# Patient Record
Sex: MALE | Race: BLACK OR AFRICAN AMERICAN
[De-identification: names, ages, dates, MRNs, and addresses within clinical notes are randomized per-mention and may not be internally consistent; named-entity substitution may affect disease eponyms.]

---

## 2018-08-21 ENCOUNTER — HOSPITAL ENCOUNTER (OUTPATIENT)
Dept: HOSPITAL 97 - ER | Age: 58
Setting detail: OBSERVATION
LOS: 1 days | Discharge: HOME | End: 2018-08-22
Attending: FAMILY MEDICINE | Admitting: INTERNAL MEDICINE
Payer: SELF-PAY

## 2018-08-21 VITALS — BODY MASS INDEX: 36.9 KG/M2

## 2018-08-21 DIAGNOSIS — E78.5: ICD-10-CM

## 2018-08-21 DIAGNOSIS — Z91.14: ICD-10-CM

## 2018-08-21 DIAGNOSIS — K76.0: ICD-10-CM

## 2018-08-21 DIAGNOSIS — F17.210: ICD-10-CM

## 2018-08-21 DIAGNOSIS — E66.01: ICD-10-CM

## 2018-08-21 DIAGNOSIS — G47.33: ICD-10-CM

## 2018-08-21 DIAGNOSIS — I48.3: Primary | ICD-10-CM

## 2018-08-21 DIAGNOSIS — I10: ICD-10-CM

## 2018-08-21 DIAGNOSIS — I07.1: ICD-10-CM

## 2018-08-21 DIAGNOSIS — R74.8: ICD-10-CM

## 2018-08-21 DIAGNOSIS — I48.0: ICD-10-CM

## 2018-08-21 LAB
HCT VFR BLD CALC: 48.9 % (ref 39.6–49)
INR BLD: 0.97
LYMPHOCYTES # SPEC AUTO: 2.5 K/UL (ref 0.7–4.9)
MCH RBC QN AUTO: 31.6 PG (ref 27–35)
MCV RBC: 92.8 FL (ref 80–100)
PMV BLD: 9.2 FL (ref 7.6–11.3)
RBC # BLD: 5.27 M/UL (ref 4.33–5.43)

## 2018-08-21 PROCEDURE — 85025 COMPLETE CBC W/AUTO DIFF WBC: CPT

## 2018-08-21 PROCEDURE — 93306 TTE W/DOPPLER COMPLETE: CPT

## 2018-08-21 PROCEDURE — 82550 ASSAY OF CK (CPK): CPT

## 2018-08-21 PROCEDURE — 36415 COLL VENOUS BLD VENIPUNCTURE: CPT

## 2018-08-21 PROCEDURE — 80048 BASIC METABOLIC PNL TOTAL CA: CPT

## 2018-08-21 PROCEDURE — 85730 THROMBOPLASTIN TIME PARTIAL: CPT

## 2018-08-21 PROCEDURE — 82553 CREATINE MB FRACTION: CPT

## 2018-08-21 PROCEDURE — 96361 HYDRATE IV INFUSION ADD-ON: CPT

## 2018-08-21 PROCEDURE — 84439 ASSAY OF FREE THYROXINE: CPT

## 2018-08-21 PROCEDURE — 96374 THER/PROPH/DIAG INJ IV PUSH: CPT

## 2018-08-21 PROCEDURE — 83880 ASSAY OF NATRIURETIC PEPTIDE: CPT

## 2018-08-21 PROCEDURE — 84443 ASSAY THYROID STIM HORMONE: CPT

## 2018-08-21 PROCEDURE — 94760 N-INVAS EAR/PLS OXIMETRY 1: CPT

## 2018-08-21 PROCEDURE — 80076 HEPATIC FUNCTION PANEL: CPT

## 2018-08-21 PROCEDURE — 83735 ASSAY OF MAGNESIUM: CPT

## 2018-08-21 PROCEDURE — 87086 URINE CULTURE/COLONY COUNT: CPT

## 2018-08-21 PROCEDURE — 96375 TX/PRO/DX INJ NEW DRUG ADDON: CPT

## 2018-08-21 PROCEDURE — 96372 THER/PROPH/DIAG INJ SC/IM: CPT

## 2018-08-21 PROCEDURE — 84484 ASSAY OF TROPONIN QUANT: CPT

## 2018-08-21 PROCEDURE — 93005 ELECTROCARDIOGRAM TRACING: CPT

## 2018-08-21 PROCEDURE — 87088 URINE BACTERIA CULTURE: CPT

## 2018-08-21 PROCEDURE — 83036 HEMOGLOBIN GLYCOSYLATED A1C: CPT

## 2018-08-21 PROCEDURE — 99285 EMERGENCY DEPT VISIT HI MDM: CPT

## 2018-08-21 PROCEDURE — 81001 URINALYSIS AUTO W/SCOPE: CPT

## 2018-08-21 PROCEDURE — 85610 PROTHROMBIN TIME: CPT

## 2018-08-21 PROCEDURE — 71045 X-RAY EXAM CHEST 1 VIEW: CPT

## 2018-08-22 VITALS — DIASTOLIC BLOOD PRESSURE: 83 MMHG | TEMPERATURE: 97.6 F | SYSTOLIC BLOOD PRESSURE: 142 MMHG

## 2018-08-22 VITALS — OXYGEN SATURATION: 94 %

## 2018-08-22 LAB
ALBUMIN SERPL BCP-MCNC: 3.6 G/DL (ref 3.4–5)
ALP SERPL-CCNC: 184 U/L (ref 45–117)
ALT SERPL W P-5'-P-CCNC: 24 U/L (ref 12–78)
AST SERPL W P-5'-P-CCNC: 15 U/L (ref 15–37)
BUN BLD-MCNC: 10 MG/DL (ref 7–18)
CKMB CREATINE KINASE MB: 1.2 NG/ML (ref 0.3–3.6)
GLUCOSE SERPLBLD-MCNC: 145 MG/DL (ref 74–106)
MAGNESIUM SERPL-MCNC: 2.1 MG/DL (ref 1.8–2.4)
NT-PROBNP SERPL-MCNC: 41 PG/ML (ref ?–125)
POTASSIUM SERPL-SCNC: 3.3 MMOL/L (ref 3.5–5.1)
TSH SERPL DL<=0.05 MIU/L-ACNC: 2.1 UIU/ML (ref 0.36–3.74)
UA COMPLETE W REFLEX CULTURE PNL UR: (no result)

## 2018-08-22 RX ADMIN — METOPROLOL TARTRATE SCH MG: 25 TABLET ORAL at 05:08

## 2018-08-22 RX ADMIN — METOPROLOL TARTRATE SCH MG: 25 TABLET ORAL at 17:02

## 2018-08-22 NOTE — EKG
Test Date:    2018-08-21               Test Time:    23:56:08

Technician:   MT                                     

                                                     

MEASUREMENT RESULTS:                                       

Intervals:                                           

Rate:         118                                    

CO:                                                  

QRSD:         126                                    

QT:           396                                    

QTc:          555                                    

Axis:                                                

P:            98                                     

CO:                                                  

QRS:          30                                     

T:            60                                     

                                                     

INTERPRETIVE STATEMENTS:                                       

                                                     

Atrial flutter with variable AV block

T wave abnormality, non specific

Abnormal ECG

Compared to ECG 08/21/2018 23:29:05

no significant change from previous ECG

Electronically Signed On 08-22-18 07:19:01 CDT by Ebenezer Hsu

## 2018-08-22 NOTE — P.DS
Admission Date: 08/22/18


Discharge Date: 08/22/18


Primary Care Provider: none


Disposition: ROUTINE DISCHARGE


Discharge Condition: GOOD


Reason for Admission: Atrial flutter


Consultations: 





Cardiology-Dr. Neumann


Procedures: 





Echocardiogram:





- Problems


(1) Atrial flutter


Current Visit: Yes   Status: Acute   


Qualifiers: 


   Atrial flutter type: typical   Qualified Code(s): I48.3 - Typical atrial 

flutter   





(2) Tobacco abuse


Current Visit: Yes   Status: Chronic   





(3) Fatty liver


Onset Date: 10/17/17   Current Visit: No   Status: Chronic   





(4) HTN (hypertension)


Onset Date: 04/27/16   Current Visit: No   Status: Chronic   


Qualifiers: 


   Hypertension type: essential hypertension   Qualified Code(s): I10 - 

Essential (primary) hypertension   





(5) Obesity


Current Visit: No   Status: Chronic   


Qualifiers: 


   Obesity type: unspecified obesity type   Obesity classification: adult class 

2 (BMI 35 - 39.9)   Serious obesity comorbidity presence: with serious 

comorbidity   Body mass index: BMI 38.0-38.9   Qualified Code(s): E66.01 - 

Morbid (severe) obesity due to excess calories; Z68.38 - Body mass index (BMI) 

38.0-38.9, adult   





(6) Obstructive sleep apnea


Current Visit: No   Status: Suspected   


Brief History of Present Illness: 





57-year-old  male presented emergency room with chest pain, 

dizziness and palpitations.  Patient has a history of atrial fibrillation and 

hypertension.  Patient has been non compliant with his medication.  He decided 

to stop this medication about 5 months ago.  Patient presented to emergency 

room found to be in atrial flutter.  Patient given medication and admitted for 

treatment.


Hospital Course: 





Patient admitted for chest pain, palpitations and dizziness.  Patient found to 

be in atrial flutter.  Patient with history of atrial fibrillation in the past 

but non compliant.  Patient stopped his medications about 5 months ago.  

Patient was given medication.  Rate better controlled.  Patient also started on 

anti coagulation therapy.  Patient evaluated by Cardiology.  No further 

intervention is required at this time.  Cardiology recommends to continue with 

metoprolol 25 mg 1 pill twice daily.  Patient will also be started on anti 

coagulation therapy-Eliquis 5 mg 1 pill twice daily.  Compliance was addressed 

in detail with the patient.  Patient understands this.  Recommendation is for 

the patient to follow up with cardiology in 1 week to follow up this 

hospitalization and continue his care.





Patient has hypertension.  Medications have been adjusted.  Patient will 

continue with metoprolol 25 mg 1 pill twice daily.  Recommendation is to 

maintain blood pressures less 150/80.  Further adjustment can be done by his 

PCP.





Patient has hyperlipidemia.  Patient will continue with medication-Lipitor 10 

mg daily.





Patient likely has underlying obstructive sleep apnea.  Recommendation is for 

the patient to have a sleep study done as an outpatient to further evaluate.


Vital Signs/Physical Exam: 














Temp Pulse Resp BP Pulse Ox


 


 98.4 F   119 H  20   119/79   96 


 


 08/22/18 08:00  08/22/18 08:00  08/22/18 08:00  08/22/18 08:00  08/22/18 08:00








General: Alert, In no apparent distress, Oriented x3, Cooperative


HEENT: Atraumatic


Neck: Supple


Respiratory: Clear to auscultation bilaterally, Normal air movement


Cardiovascular: Irregular heart rate/rhythm (Atrial flutter rate better 

controlled)


Gastrointestinal: Normal bowel sounds, Soft and benign, Non-distended, No 

tenderness, No masses, No rebound, No guarding


Musculoskeletal: No erythema, No tenderness, No warmth


Integumentary: No tenderness/swelling, No erythema, No warmth, No cyanosis


Neurological: Normal speech, Normal strength at 5/5 x4 extr, Normal tone, 

Normal affect


Laboratory Data at Discharge: 














WBC  9.4 K/uL (4.3-10.9)   08/21/18  23:30    


 


Hgb  16.6 g/dL (13.6-17.9)   08/21/18  23:30    


 


Hct  48.9 % (39.6-49.0)   08/21/18  23:30    


 


Plt Count  268 K/uL (152-406)   08/21/18  23:30    


 


PT  11.5 SECONDS (9.5-12.5)   08/21/18  23:30    


 


INR  0.97   08/21/18  23:30    


 


APTT  36.6 SECONDS (24.3-36.9)   08/21/18  23:30    


 


Sodium  143 mmol/L (136-145)   08/21/18  23:30    


 


Potassium  3.3 mmol/L (3.5-5.1)  L  08/21/18  23:30    


 


BUN  10 mg/dL (7-18)   08/21/18  23:30    


 


Creatinine  1.10 mg/dL (0.55-1.3)   08/21/18  23:30    


 


Glucose  145 mg/dL ()  H  08/21/18  23:30    


 


Magnesium  2.1 mg/dL (1.8-2.4)   08/21/18  23:30    


 


Total Bilirubin  0.2 mg/dL (0.2-1.0)   08/21/18  23:30    


 


AST  15 U/L (15-37)   08/21/18  23:30    


 


ALT  24 U/L (12-78)   08/21/18  23:30    


 


Alkaline Phosphatase  184 U/L ()  H  08/21/18  23:30    


 


Troponin I  0.14 ng/mL (0.0-0.045)  H  08/22/18  10:10    








Home Medications: 








Apixaban [Eliquis] 5 mg PO BID #60 tablet 08/22/18 


Atorvastatin Calcium [Lipitor] 10 mg PO BEDTIME #30 tab 08/22/18 


Metoprolol Tartrate [Lopressor*] 25 mg PO BID 6AM 6PM #60 tab 08/22/18 





New Medications: 


Apixaban [Eliquis] 5 mg PO BID #60 tablet


Atorvastatin Calcium [Lipitor] 10 mg PO BEDTIME #30 tab


Metoprolol Tartrate [Lopressor*] 25 mg PO BID 6AM 6PM #60 tab


Patient Discharge Instructions: 1.  Patient will need a follow up with his PCP 

in 1 week to follow up this hospitalization.  Patient will be provided 

information on PCPs in the area.  2.  Patient admitted for chest pain, 

palpitations and dizziness.  Patient found to be in atrial flutter.  Patient 

with history of atrial fibrillation in the past but non compliant.  Patient 

stopped his medications about 5 months ago.  Patient was given medication.  

Rate better controlled.  Patient also started on anti coagulation therapy.  

Patient evaluated by Cardiology.  No further intervention is required at this 

time.  Cardiology recommends to continue with metoprolol 25 mg 1 pill twice 

daily.  Patient will also be started on anti coagulation therapy-Eliquis 5 mg 1 

pill twice daily.  Compliance was addressed in detail with the patient.  

Patient understands this.  Recommendation is for the patient to follow up with 

cardiology in 1 week to follow up this hospitalization and continue his care.  

3.  Patient has hypertension.  Medications have been adjusted.  Patient will 

continue with metoprolol 25 mg 1 pill twice daily.  Recommendation is to 

maintain blood pressures less 150/80.  Further adjustment can be done by his 

PCP.  4. Patient has hyperlipidemia.  Patient will continue with medication-

Lipitor 10 mg daily.  5.  Patient likely has underlying obstructive sleep 

apnea.  Recommendation is for the patient to have a sleep study done as an 

outpatient to further evaluate.


Diet: AHA


Activity: Ad angélica


Time spent managing pt's care (in minutes): 55

## 2018-08-22 NOTE — ER
Nurse's Notes                                                                                     

 [facility]                                                                

Name: Damian Briggs                                                                                 

Age: 57 yrs                                                                                       

Sex: Male                                                                                         

: 1960                                                                                   

MRN: [id number]                                                                                   

Arrival Date: 2018                                                                          

Time: 23:22                                                                                       

Account#: [id number]                                                                            

Bed 2                                                                                             

Private MD:                                                                                       

Diagnosis: Atrial fibrillation and flutter;Chest pain, unspecified;Shortness of breath            

                                                                                                  

Presentation:                                                                                     

                                                                                             

23:36 Presenting complaint: Patient states: "I felt my heart racing"; States feeling          lp1 

      palpitations that began at 2200, complaint of feeling dizzy upon standing, short of         

      breath, chest pain. Transition of care: patient was not received from another setting       

      of care. Onset of symptoms was 2018 at 22:00. Risk Assessment: Do you want       

      to hurt yourself or someone else? Patient reports no desire to harm self or others.         

      Initial Sepsis Screen: Does the patient meet any 2 criteria? No. Patient's initial          

      sepsis screen is negative. Does the patient have a suspected source of infection? No.       

      Patient's initial sepsis screen is negative. Care prior to arrival: None.                   

23:36 Method Of Arrival: Wheelchair                                                           lp1 

23:36 Acuity: LIYA 2                                                                           lp1 

                                                                                                  

Triage Assessment:                                                                                

23:55 General: Appears in no apparent distress. Behavior is calm, cooperative.                ak1 

                                                                                             

00:03 Pain: Complains of pain in chest. EENT: No signs and/or symptoms were reported          ak1 

      regarding the EENT system. Neuro: No deficits noted. Cardiovascular: Reports chest          

      pain, diaphoresis, lightheadedness, palpitations, shortness of breath, Rhythm is atrial     

      flutter. Respiratory: Reports shortness of breath. GI: No signs and/or symptoms were        

      reported involving the gastrointestinal system. : No signs and/or symptoms were           

      reported regarding the genitourinary system. Derm: No signs and/or symptoms reported        

      regarding the dermatologic system. Musculoskeletal: No signs and/or symptoms reported       

      regarding the musculoskeletal system.                                                       

                                                                                                  

Historical:                                                                                       

- Allergies:                                                                                      

                                                                                             

23:38 No Known Allergies;                                                                     lp1 

- Home Meds:                                                                                      

23:38 None [Active];                                                                          lp1 

- PMHx:                                                                                           

23:38 Hypertension;                                                                           lp1 

- PSHx:                                                                                           

23:38 None;                                                                                   lp1 

                                                                                                  

- Immunization history:: Adult Immunizations up to date.                                          

- Social history:: Smoking status: Patient uses tobacco products, smokes one pack                 

  cigarettes per day.                                                                             

- Ebola Screening: : No symptoms or risks identified at this time.                                

                                                                                                  

                                                                                                  

Screenin:38 Abuse screen: Denies threats or abuse. Denies injuries from another. Nutritional        lp1 

      screening: No deficits noted. Tuberculosis screening: No symptoms or risk factors           

      identified. Fall Risk None identified.                                                      

                                                                                                  

Assessment:                                                                                       

                                                                                             

00:29 Reassessment: Patient appears in no apparent distress at this time. No changes from     ak1 

      previously documented assessment. see triage assessment.                                    

02:00 Reassessment: Patient appears in no apparent distress at this time. Patient and/or      lp1 

      family updated on plan of care and expected duration. Pain level reassessed. Patient is     

      alert, oriented x 3, equal unlabored respirations, skin warm/dry/pink. Patient aware of     

      admission.                                                                                  

                                                                                                  

Vital Signs:                                                                                      

                                                                                             

23:37  / 100; Pulse 140; Resp 22; Temp 98.4(O); Pulse Ox 100% on R/A; Weight 99.79 kg;  lp1 

      Height 5 ft. 7 in. (170.18 cm); Pain 6/10;                                                  

23:45  / 110; Pulse 139; Resp 18;                                                       ak1 

23:50  / 113; Pulse 118; Resp 16; Pulse Ox 100% ;                                       ak1 

                                                                                             

00:13  / 108; Pulse 120; Resp 18; Pulse Ox 100% on R/A;                                 ak1 

00:28  / 108; Pulse 120; Resp 20; Pulse Ox 100% on R/A;                                 ak1 

00:57  / 106; Pulse 120; Resp 20; Pulse Ox 97% on R/A;                                  ak1 

01:13  / 97; Pulse 121; Resp 20; Pulse Ox 98% on R/A;                                   mt  

01:32  / 99; Pulse 132; Resp 20; Pulse Ox 98% on R/A;                                   mt  

02:38  / 99; Pulse 123; Resp 20; Pulse Ox 98% on R/A; Pain 0/10;                        lp1 

03:30  / 93; Pulse 121; Resp 20; Pulse Ox 99% on R/A;                                   lp1 

                                                                                             

23:37 Body Mass Index 34.46 (99.79 kg, 170.18 cm)                                             lp1 

                                                                                                  

ED Course:                                                                                        

                                                                                             

23:22 Patient arrived in ED.                                                                  es  

23:28 Flakito Muñoz NP is PHCP.                                                           pm1 

23:28 Jason Strong MD is Attending Physician.                                            pm1 

23:37 Triage completed.                                                                       lp1 

23:38 Kalani Guerra RN is Primary Nurse.                                                     ak1 

23:38 Arm band placed on left wrist.                                                          lp1 

23:39 Patient has correct armband on for positive identification. Placed in gown. Bed in low  lp1 

      position. Call light in reach. Cardiac monitor on. Pulse ox on. NIBP on.                    

23:39 Inserted saline lock: 20 gauge in left antecubital area, using aseptic technique. Blood lp1 

      collected. By Magnolia Schultz RN.                                                           

23:39 EKG done, by ED staff, reviewed by Flakito Muñoz NP.                                  lp1 

                                                                                             

00:02 X-ray completed. Portable x-ray completed in exam room. Patient tolerated procedure     kw  

      well.                                                                                       

00:22 XRAY Chest (1 view) In Process Unspecified.                                             EDMS

01:29 Adeel Javed MD is Hospitalizing Provider.                                          pm1 

01:48 No provider procedures requiring assistance completed. Patient admitted, IV remains in  lp1 

      place.                                                                                      

01:49 Report received from Kalani Guerra RN.                                                  lp1 

                                                                                                  

Administered Medications:                                                                         

                                                                                             

23:40 Drug: Aspirin 325 mg Route: PO;                                                         ak1 

23:45 Follow up:  / 110; Pulse 139 bpm; Resp 18 bpm                                     ak1 

23:45 Drug: Lopressor 5 mg Route: IVP; Site: left antecubital;                                ak1 

23:50 Drug: Lopressor 5 mg Route: IVP; Site: left antecubital;                                ak1 

                                                                                             

00:12 Drug: Lopressor 5 mg Route: IVP; Site: left antecubital;                                ak1 

00:12 Follow up: Response: No adverse reaction                                                ak1 

00:28 Drug: Lovenox 1 mg/kg Route: Sub-Q; Site: right lower abdomen;                          ak1 

00:29 Follow up: Response: No adverse reaction                                                ak1 

00:28 Drug: Lopressor (metoprolol TARTRATE) 50 mg Route: PO;                                  ak1 

00:30 Follow up: Response: No adverse reaction                                                ak1 

00:40 Drug: Cardizem 10 mg Route: IVP; Site: left antecubital;                                ak1 

01:48 Follow up: Response: No adverse reaction                                                lp1 

01:47 Drug: NS 0.9% 1000 ml Route: IV; Rate: 125 ml/hr; Site: left antecubital;               lp1 

02:36 Follow up: IV Status: IV converted to saline lock                                       lp1 

02:32 Drug: Potassium Effervescent Tablet 50 mEq Route: PO;                                   lp1 

03:58 Follow up: Response: No adverse reaction                                                lp1 

                                                                                                  

                                                                                                  

Outcome:                                                                                          

01:30 Decision to Hospitalize by Provider.                                                    pm1 

01:52 Condition: stable                                                                       lp1 

01:52 Instructed on the need for admit.                                                           

03:00 Admitted to Med/surg accompanied by nurse, via wheelchair, room 207, with chart, Report lp1 

      called to  Kinza Karimi RN                                                                   

03:58 Patient left the ED.                                                                    lp1 

                                                                                                  

Signatures:                                                                                       

Dispatcher [employer]                           EDMS                                                 

Izzy Hannon Kimberlee kw Pena, Laura, RN                         RN   lp1                                                  

Kalani Guerra RN                       RN   ak1                                                  

Flakito Muñoz, NP                    NP   pm1                                                  

Janine Eason mt                                                   

                                                                                                  

Corrections: (The following items were deleted from the chart)                                    

                                                                                             

23:41 23:37  / 100; Pulse 140bpm; Resp 22bpm; Pulse Ox 100% RA; Pain 6/10; lp1          lp1 

                                                                                                  

**************************************************************************************************

## 2018-08-22 NOTE — P.HP
Certification for Inpatient


Patient admitted to: Observation


With expected LOS: <2 Midnights


Practitioner: I am a practitioner with admitting privileges, knowledge of 

patient current condition, hospital course, and medical plan of care.


Services: Services provided to patient in accordance with Admission 

requirements found in Title 42 Section 412.3 of the Code of Federal Regulations





Patient History


Date of Service: 08/22/18


Reason for admission: Atrial flutter


History of Present Illness: 





Mr. Briggs is a 57-year-old male history of hypertension, tobacco abuse, previous 

episode of atrial flutter.  According to him, he is to take about 10 pills 

because this problem, prescribed by his cardiologist, however, since 5 months 

ago he stopped his medication since his was feeling better.  About 2200 last 

night, the patient started feeling palpitation, associated with dizziness.  He 

also had chest pain, located in his left parasternal area, not radiated, about 5

/10 of intensity.  At arrival to ED, his HR was of 150s.  He also was 

hypertensive, BP was 190/100.  No history of fever.  The patient was treated 

with IV Cardizem and Lopressor, his HR final decreased to 110-120.  Chest pain 

resolved when the HR was better controlled.  Laboratory work remarkable for 

hypokalemia, 3.3, troponin I is negative so far.


Allergies





No Known Allergies Allergy (Verified 12/12/17 10:45)


 





Home medications list reviewed: Yes


Home Medications: 








Lisinopril [Prinivil*] 20 mg PO BID #60 tab 10/20/17 








- Past Medical/Surgical History


Diabetic: No


-: HTN


-: Tobacco abuse


-: Atrial flutter


Past Surgical History: Reviewed- Non-Contributory


Psychosocial/ Personal History: The patient is  2 years.  He has no 

children.  He works as a 





- Family History


  ** Mother


-: Heart disease





- Social History


Smoking Status: Current every day smoker


Counseled patient to stop smoking for: less than 10 minutes


Smoking therapy provided: Yes


Patient receptive to therapy: No


Alcohol use: Yes


CD- Drugs: No


Caffeine use: Yes


Place of Residence: Home





Review of Systems


10-point ROS is otherwise unremarkable





Physical Examination





- Physical Exam


General: Alert, In no apparent distress


HEENT: Atraumatic, PERRLA, Mucous membr. moist/pink, EOMI, Sclerae nonicteric


Neck: Supple, 2+ carotid pulse no bruit, No LAD, Without JVD or thyroid 

abnormality


Respiratory: Clear to auscultation bilaterally, Normal air movement


Cardiovascular: Normal S1 S2, Irregular heart rate/rhythm


Gastrointestinal: Normal bowel sounds, No tenderness


Musculoskeletal: No tenderness


Integumentary: No rashes


Neurological: Normal speech, Normal strength at 5/5 x4 extr, Normal tone, 

Normal affect


Lymphatics: No axilla or inguinal lymphadenopathy





- Studies


Laboratory Data (last 24 hrs)





08/21/18 23:30: PT 11.5, INR 0.97, APTT 36.6


08/21/18 23:30: WBC 9.4, Hgb 16.6, Hct 48.9, Plt Count 268


08/21/18 23:30: Sodium 143, Potassium 3.3 L, BUN 10, Creatinine 1.10, Glucose 

145 H, Magnesium 2.1, Total Bilirubin 0.2, AST 15, ALT 24, Alkaline Phosphatase 

184 H








Assessment and Plan





- Problems (Diagnosis)


(1) Atrial flutter


Current Visit: Yes   Status: Acute   


Qualifiers: 


   Atrial flutter type: typical   Qualified Code(s): I48.3 - Typical atrial 

flutter   





(2) Tobacco abuse


Current Visit: Yes   Status: Acute   





(3) HTN (hypertension)


Onset Date: 04/27/16   Current Visit: No   Status: Chronic   


Qualifiers: 


   Hypertension type: essential hypertension   Qualified Code(s): I10 - 

Essential (primary) hypertension   





- Plan





Mr. Briggs will be admitted to the hospital due to atrial flutter with 2:1 block.

  Rate has been better controlled with IV Cardizem and Lopressor.  Will start 

full anticoagulation and keep him NPO in case the patient require electric 

cardioversion.  Will continue on a beta-blocker, consult cardiologist.  Will 

order echo in the a.m.





- Advance Directives


Does patient have a Living Will: No


Does patient have a Durable POA for Healthcare: No





- Code Status/Comfort Care


Code Status Assessed: Yes


Code Status: Full Code

## 2018-08-22 NOTE — RAD REPORT
EXAM DESCRIPTION:  RAD - Chest Single View - 8/22/2018 12:21 am

 

CLINICAL HISTORY:  Palpitations, chest pain, shortness of breath

 

COMPARISON:  None.

 

TECHNIQUE:  AP portable chest image was obtained 2352 hours .

 

FINDINGS:  Lungs are clear. Heart and vasculature are normal. No measurable pleural effusion and no p
neumothorax. No gross bony abnormality seen. No acute aortic findings suspected.

 

IMPRESSION:  No acute cardiopulmonary process.

## 2018-08-22 NOTE — CON
Date of Consultation:  08/22/2018



Additional Admitting Physician:  Ruben Matt DO.



Reason For Consultation:  Atrial fibrillation and flutter.



History Of Present Illness:  Mr. Briggs is a 57-year-old black male with history of hypertension, has h
ad a history of atrial fibrillation before, in 2016 had a negative echocardiogram and negative Lexisc
an, was placed on some medication at that point, but he basically quit taking his medication, came in
 with palpitation, was found to have atrial flutter to 2-1 ratio.  This has already resolved on metop
rolol 25 mg b.i.d.  He is on Lovenox right now.  He is asymptomatic.  Denied any chest pain, nausea, 
vomiting, diaphoresis, PND, orthopnea, pedal edema, or syncope.  His troponin was 0.19.  Glucose was 
145.  His potassium was 3.3.



Past Medical History:  Hypertension and paroxysmal atrial fibrillation.



Allergies:  NONE.



Review of Systems:

Negative.



Social History:  Negative.



Medications At Home:  Supposed to be Prinivil, but I am not so sure he is taking it.



Family History:  Noncontributory.



Physical Examination:

Vital Signs:  Stable.  He is in a sinus rhythm now.  Afebrile. 

HEENT:  Negative. 

Neck:  Supple with no bruit. 

Chest:  Clear. 

Cardiac:  Revealed a regular rhythm and rate without any murmurs, gallops, or rubs. 

Abdomen:  Benign. 

Extremities:  Revealed no clubbing, cyanosis, or edema.



Diagnostic Data:  As stated earlier.



Impression And Plan:  

1.Paroxysmal atrial fibrillation, resolved on beta-blocker.

2.Hypertension.

3.Noncompliance.  

I think Mr. Briggs needs to be on a beta-blocker and he should be on anticoagulant.  He has a high YUE
S score and echocardiogram is pending.  Compliance with his medication needs to be re-addressed.  He 
can go home as far as I am concerned on a beta-blocker and either Xarelto or Eliquis.  I will leave t
hat up to Dr. Matt.  I would like to see him in the office in the next 2 weeks.  He has had a negat
derrick stress test in 2016 and I see no need to repeat that.  His potassium needs to be corrected.  Elev
ation in his troponin is secondary to his arrhythmia.





NB/MODL

DD:  08/22/2018 18:37:10Voice ID:  [id number]

DT:  08/22/2018 23:11:53Report ID:  [id number]

## 2018-08-22 NOTE — EKG
Test Date:    2018-08-21               Test Time:    23:29:05

Technician:   MT                                     

                                                     

MEASUREMENT RESULTS:                                       

Intervals:                                           

Rate:         148                                    

WI:                                                  

QRSD:         86                                     

QT:           346                                    

QTc:          543                                    

Axis:                                                

P:            269                                    

WI:                                                  

QRS:          28                                     

T:            264                                    

                                                     

INTERPRETIVE STATEMENTS:                                       

                                                     

Atrial flutter with variable AV block

Abnormal ECG

Compared to ECG 04/26/2016 13:05:23

Sinus bradycardia no longer present



Electronically Signed On 08-22-18 07:19:23 CDT by Ebenezer Hsu

## 2018-08-22 NOTE — EKG
Test Date:    2018-08-22               Test Time:    00:10:18

Technician:   MT                                     

                                                     

MEASUREMENT RESULTS:                                       

Intervals:                                           

Rate:         119                                    

TX:                                                  

QRSD:         80                                     

QT:           342                                    

QTc:          481                                    

Axis:                                                

P:            248                                    

TX:                                                  

QRS:          18                                     

T:            -72                                    

                                                     

INTERPRETIVE STATEMENTS:                                       

                                                     

Atrial flutter with 2:1 AV conduction

Left ventricular hypertrophy with repolarization abnormality

Anterior infarct, age undetermined

Abnormal ECG

Compared to ECG 08/21/2018 23:56:08

Myocardial infarct finding now present

ST (T wave) deviation no longer present

T-wave abnormality no longer present

Possible ischemia no longer present



Electronically Signed On 08-22-18 07:17:58 CDT by Ebenezer Hsu

## 2018-08-22 NOTE — EDPHYS
Physician Documentation                                                                           

 [facility]                                                                

Name: Damian Briggs                                                                                 

Age: 57 yrs                                                                                       

Sex: Male                                                                                         

: 1960                                                                                   

MRN: [id number]                                                                                   

Arrival Date: 2018                                                                          

Time: 23:22                                                                                       

Account#: [id number]                                                                            

Bed 2                                                                                             

Private MD:                                                                                       

ED Physician Jason Strong                                                                     

HPI:                                                                                              

                                                                                             

00:23 This 57 yrs old Black Male presents to ER via Wheelchair with complaints of             pm1 

      Palpitations.                                                                               

00:23 The patient presents with a history of heart racing. Context: The symptoms occur at     pm1 

      rest. Onset: The symptoms/episode began/occurred today, at 22:00. Duration: The patient     

      or guardian reports a single episode, that is still ongoing. Modifying factors: The         

      symptoms are aggravated by light activity, Standing The symptoms are alleviated by          

      nothing. Associated signs and symptoms: Pertinent positives: chest pain,                    

      lightheadedness, SOB, Pertinent negatives: fever, nausea, vomiting. Severity of             

      symptoms: in the emergency department the symptoms are unchanged. The patient has           

      experienced similar episodes in the past, a few times, with the last episode occurring      

      About 1 year ago. Patient was treated in [place] [facility] and transfer here to this facility.       

      Patient had atrial fibrillation / flutter and was medically managed. Patient decided to     

      stop taking the medications shortly after discharge from the hospital 1 year ago. The       

      patient has not recently seen a physician. Patient is not currently taking any              

      medications.                                                                                

                                                                                                  

Historical:                                                                                       

- Allergies:                                                                                      

                                                                                             

23:38 No Known Allergies;                                                                     lp1 

- Home Meds:                                                                                      

23:38 None [Active];                                                                          lp1 

- PMHx:                                                                                           

23:38 Hypertension;                                                                           lp1 

- PSHx:                                                                                           

23:38 None;                                                                                   lp1 

                                                                                                  

- Immunization history:: Adult Immunizations up to date.                                          

- Social history:: Smoking status: Patient uses tobacco products, smokes one pack                 

  cigarettes per day.                                                                             

- Ebola Screening: : No symptoms or risks identified at this time.                                

                                                                                                  

                                                                                                  

ROS:                                                                                              

                                                                                             

00:31 Constitutional: Negative for fever, chills, and weight loss, Eyes: Negative for injury, pm1 

      pain, redness, and discharge, ENT: Negative for injury, pain, and discharge, Neck:          

      Negative for injury, pain, and swelling.                                                    

      Abdomen/GI: Negative for abdominal pain, nausea, vomiting, diarrhea, and constipation,      

      Back: Negative for injury and pain, : Negative for injury, bleeding, discharge, and       

      swelling, MS/Extremity: Negative for injury and deformity, Skin: Negative for injury,       

      rash, and discoloration, Neuro: Negative for headache, weakness, numbness, tingling,        

      and seizure.                                                                                

      Cardiovascular: Positive for chest pain, of the anterior aspect of left upper chest,        

      palpitations, Negative for edema.                                                           

      Respiratory: Positive for shortness of breath, Negative for cough, wheezing.                

                                                                                                  

Exam:                                                                                             

00:31 Constitutional:  This is a well developed, well nourished patient who is awake, alert,  pm1 

      and in no acute distress. Head/Face:  Normocephalic, atraumatic. Eyes:  Pupils equal        

      round and reactive to light, extra-ocular motions intact.  Lids and lashes normal.          

      Conjunctiva and sclera are non-icteric and not injected.  Cornea within normal limits.      

      Periorbital areas with no swelling, redness, or edema. ENT:  Nares patent. No nasal         

      discharge, no septal abnormalities noted.  Tympanic membranes are normal and external       

      auditory canals are clear.  Oropharynx with no redness, swelling, or masses, exudates,      

      or evidence of obstruction, uvula midline.  Mucous membranes moist. Neck:  Trachea          

      midline, no thyromegaly or masses palpated, and no cervical lymphadenopathy.  Supple,       

      full range of motion without nuchal rigidity, or vertebral point tenderness.  No            

      Meningismus. Chest/axilla:  Normal chest wall appearance and motion.  Nontender with no     

      deformity.  No lesions are appreciated.                                                     

00:31 Respiratory:  Lungs have equal breath sounds bilaterally, clear to auscultation and         

      percussion.  No rales, rhonchi or wheezes noted.  No increased work of breathing, no        

      retractions or nasal flaring. Abdomen/GI:  Soft, non-tender, with normal bowel sounds.      

      No distension or tympany.  No guarding or rebound.  No evidence of tenderness               

      throughout. Back:  No spinal tenderness.  No costovertebral tenderness.  Full range of      

      motion. Skin:  Warm, dry with normal turgor.  Normal color with no rashes, no lesions,      

      and no evidence of cellulitis. MS/ Extremity:  Pulses equal, no cyanosis.                   

      Neurovascular intact.  Full, normal range of motion.                                        

00:31 Cardiovascular: Rate: tachycardic, Rhythm: irregular, Pulses: no pulse deficits are         

      appreciated, Heart sounds: normal, normal S1and S2, no murmur, no rub, no gallop,           

      Edema: is not appreciated.                                                                  

00:31 ECG was reviewed by the Attending Physician. Initial ECG: Atrial flutter with variable      

      AV block, 148 bpm.  After lopressor IV: Atrial flutter 2:1,                          

00:31 Neuro: Orientation: is normal, Motor: is normal, moves all fours.                           

                                                                                                  

Vital Signs:                                                                                      

                                                                                             

23:37  / 100; Pulse 140; Resp 22; Temp 98.4(O); Pulse Ox 100% on R/A; Weight 99.79 kg;  lp1 

      Height 5 ft. 7 in. (170.18 cm); Pain 6/10;                                                  

23:45  / 110; Pulse 139; Resp 18;                                                       ak1 

23:50  / 113; Pulse 118; Resp 16; Pulse Ox 100% ;                                       ak1 

                                                                                             

00:13  / 108; Pulse 120; Resp 18; Pulse Ox 100% on R/A;                                 ak1 

00:28  / 108; Pulse 120; Resp 20; Pulse Ox 100% on R/A;                                 ak1 

00:57  / 106; Pulse 120; Resp 20; Pulse Ox 97% on R/A;                                  ak1 

01:13  / 97; Pulse 121; Resp 20; Pulse Ox 98% on R/A;                                   mt  

01:32  / 99; Pulse 132; Resp 20; Pulse Ox 98% on R/A;                                   mt  

02:38  / 99; Pulse 123; Resp 20; Pulse Ox 98% on R/A; Pain 0/10;                        lp1 

03:30  / 93; Pulse 121; Resp 20; Pulse Ox 99% on R/A;                                   lp1 

                                                                                             

23:37 Body Mass Index 34.46 (99.79 kg, 170.18 cm)                                             lp1 

                                                                                                  

MDM:                                                                                              

                                                                                             

23:31 Patient medically screened.                                                             pm1 

                                                                                             

00:28 ED course: Dr. Strong recommended Cardizem 10 mg IV since Lopressor is currently not    pm1 

      effective. Current heart rate 119.                                                          

00:35 Data reviewed: vital signs. Data interpreted: Pulse oximetry: on room air is 100 %.     pm1 

      Interpretation: normal. Counseling: I had a detailed discussion with the patient and/or     

      guardian regarding: the historical points, exam findings, and any diagnostic results        

      supporting the discharge/admit diagnosis, lab results, radiology results, the need for      

      further work-up and treatment in the hospital.                                              

00:55 ED course: Dr. Strong recommend consultation with cardiology since no improvement with  pm1 

      Cardizem .                                                                                  

01:05 ED course: Dr. Mantilla answering service contacted .                                   pm1 

01:28 Physician consultation: Adeel Javed MD was called at 01:28, was contacted at 01:28,  pm1 

      regarding admission, patient's condition, and will see patient in ED.                       

                                                                                                  

                                                                                             

23:34 Order name: Basic Metabolic Panel; Complete Time: 00:10                                 pm                                                                                             

23:34 Order name: CBC with Diff; Complete Time: 00:10                                         pm                                                                                             

23:34 Order name: Ckmb; Complete Time: 00:10                                                  pm                                                                                             

23:34 Order name: CPK; Complete Time: 00:10                                                   pm                                                                                             

23:34 Order name: LFT's; Complete Time: 00:10                                                 pm                                                                                             

23:34 Order name: Magnesium; Complete Time: 00:10                                             pm                                                                                             

23:34 Order name: NT PRO-BNP; Complete Time: 00:10                                            pm                                                                                             

23:34 Order name: PT-INR; Complete Time: 00:10                                                pm                                                                                             

23:34 Order name: Ptt, Activated; Complete Time: 00:10                                        pm                                                                                             

23:34 Order name: Troponin (emerg Dept Use Only); Complete Time: 00:30                        pm1 

                                                                                             

23:34 Order name: XRAY Chest (1 view)                                                         pm1 

                                                                                             

23:34 Order name: EKG; Complete Time: 23:35                                                   pm                                                                                             

23:34 Order name: Cardiac monitoring; Complete Time: 23:35                                    pm                                                                                             

23:34 Order name: EKG - Nurse/Tech; Complete Time: 23:35                                      pm1 

                                                                                             

23:34 Order name: IV Saline Lock; Complete Time: 23:35                                        pm1 

                                                                                             

23:34 Order name: Labs collected and sent; Complete Time: 23:35                               pm                                                                                             

23:34 Order name: O2 Per Protocol; Complete Time: 23:35                                       pm                                                                                             

23:34 Order name: O2 Sat Monitoring; Complete Time: 23:35                                     pm1 

                                                                                                  

Administered Medications:                                                                         

                                                                                             

23:40 Drug: Aspirin 325 mg Route: PO;                                                         ak1 

23:45 Follow up:  / 110; Pulse 139 bpm; Resp 18 bpm                                     ak1 

23:45 Drug: Lopressor 5 mg Route: IVP; Site: left antecubital;                                ak1 

23:50 Drug: Lopressor 5 mg Route: IVP; Site: left antecubital;                                ak1 

                                                                                             

00:12 Drug: Lopressor 5 mg Route: IVP; Site: left antecubital;                                ak1 

00:12 Follow up: Response: No adverse reaction                                                ak1 

00:28 Drug: Lovenox 1 mg/kg Route: Sub-Q; Site: right lower abdomen;                          ak1 

00:29 Follow up: Response: No adverse reaction                                                ak1 

00:28 Drug: Lopressor (metoprolol TARTRATE) 50 mg Route: PO;                                  ak1 

00:30 Follow up: Response: No adverse reaction                                                ak1 

00:40 Drug: Cardizem 10 mg Route: IVP; Site: left antecubital;                                ak1 

01:48 Follow up: Response: No adverse reaction                                                lp1 

01:47 Drug: NS 0.9% 1000 ml Route: IV; Rate: 125 ml/hr; Site: left antecubital;               lp1 

02:36 Follow up: IV Status: IV converted to saline lock                                       lp1 

02:32 Drug: Potassium Effervescent Tablet 50 mEq Route: PO;                                   lp1 

03:58 Follow up: Response: No adverse reaction                                                lp1 

                                                                                                  

                                                                                                  

Disposition:                                                                                      

04:25 Co-signature as Attending Physician, Jason Strong MD I agree with the assessment and tw4 

      plan of care. Attestation: The patient's history, exam findings, diagnostics, and a         

      summary of any interventions or procedures was reviewed in detail with Flakito Muñoz NP.                                                                                         

                                                                                                  

Disposition:                                                                                      

18 01:30 Hospitalization ordered by Adeel Javed for Inpatient Admission. Preliminary    

  diagnosis are Atrial fibrillation and flutter, Chest pain, unspecified,                         

  Shortness of breath.                                                                            

- Bed requested for Telemetry/MedSurg (Inpatient).                                                

- Status is Inpatient Admission.                                                              lp1 

- Condition is Stable.                                                                            

- Problem is new.                                                                                 

- Symptoms have improved.                                                                         

UTI on Admission? No                                                                              

                                                                                                  

                                                                                                  

                                                                                                  

Signatures:                                                                                       

Dispatcher [employer]                           EDMS                                                 

Sugar Pretty RN RN   lp1                                                  

Kalani Guerra RN RN   ak1                                                  

Yumi King RN RN cg Marinas, Patrick, NP NP   pmJason Padilla MD MD   tw4                                                  

                                                                                                  

Corrections: (The following items were deleted from the chart)                                    

:30 01:30 Hospitalization Ordered by Adeel Javed MD for Inpatient Admission. Preliminary pm1 

      diagnosis is Atrial fibrillation and flutter. Bed requested for Telemetry/MedSurg           

      (Inpatient). Status is Inpatient Admission. Condition is Stable. Problem is new.            

      Symptoms have improved. UTI on Admission? No. pm1                                           

01:43 01:30 2018 01:30 Hospitalization Ordered by Adeel Javed MD for Inpatient       cg  

      Admission. Preliminary diagnosis is Atrial fibrillation and flutter; Chest pain,            

      unspecified; Shortness of breath. Bed requested for Telemetry/MedSurg (Inpatient).          

      Status is Inpatient Admission. Condition is Stable. Problem is new. Symptoms have           

      improved. UTI on Admission? No. pm1                                                         

01:49 01:43 2018 01:30 Hospitalization Ordered by Adeel Javed MD for Inpatient       cg  

      Admission. Preliminary diagnosis is Atrial fibrillation and flutter; Chest pain,            

      unspecified; Shortness of breath. Bed requested for Telemetry/MedSurg (Inpatient).          

      Status is Inpatient Admission. Condition is Stable. Problem is new. Symptoms have           

      improved. UTI on Admission? No. cg                                                          

03:58 01:49 2018 01:30 Hospitalization Ordered by Adeel Javed MD for Inpatient       lp1 

      Admission. Preliminary diagnosis is Atrial fibrillation and flutter; Chest pain,            

      unspecified; Shortness of breath. Bed requested for Telemetry/MedSurg (Inpatient).          

      Status is Inpatient Admission. Condition is Stable. Problem is new. Symptoms have           

      improved. UTI on Admission? No. cg                                                          

                                                                                                  

**************************************************************************************************

## 2018-08-23 NOTE — ECHO
HEIGHT: 5 ft 7 in   WEIGHT: 236 lb 0 oz   DATE OF STUDY: 08/23/18   REFER DR: Adeel Burgos MD

2-DIMENSIONAL: YES

     M.MODE: YES

 DOPPLER: YES

COLOR FLOW: YES



                    TDS:  NO

PORTABLE: NO

 DEFINITY:  NO

BUBBLE STUDY: NO





DIAGNOSIS:  ATRIAL FLUTTER



CARDIAC HISTORY:  

CATHERIZATION: NO

SURGERY: NO

PROSTHETIC VALVE: NO

PACEMAKER: NO





MEASUREMENTS (cm)

    DIASTOLIC (NORMALS)                 SYSTOLIC (NORMALS)

IVSd                 1.3 (0.6-1.2)                    LA Diam 4.0 (1.9-4.0)     LVEF       
  50%  

LVIDd               4.8 (3.5-5.7)                        LVIDs      3.6 (2.0-3.5)     %FS  
        26%

LVPWd             1.4 (0.6-1.2)

Ao Diam           2.9 (2.0-3.7)



2 DIMENSIONAL ASSESSMENT:

RIGHT ATRIUM:                   NORMAL

LEFT ATRIUM:       NORMAL



RIGHT VENTRICLE:            NORMAL

LEFT VENTRICLE: NORMAL



TRICUSPID VALVE:             NORMAL

MITRAL VALVE:     NORMAL



PULMONIC VALVE:             NORMAL

AORTIC VALVE:     NORMAL



PERICARDIAL EFFUSION: NONE

AORTIC ROOT:      NORMAL





LEFT VENTRICULAR WALL MOTION:     MILD GLOBAL HYPOKINESIS.



DOPPLER/COLOR FLOW:     TRACE OF TRICUSPID REGURGITATION  NORMAL RIGHT VENTRICULAR 
SYSTOLIC PRESSURE.



COMMENTS:      MILD TRICUSPID REGURGITATION  NORMAL RIGHT VENTRICULAR SYSTOLIC PRESSURE. 
MILD GLOBAL HYPOKINESIS. NO THROMBUS. NORMAL LEFT ATRIAL SIZE.



TECHNOLOGIST:   AVILA BASS

## 2018-08-31 ENCOUNTER — HOSPITAL ENCOUNTER (INPATIENT)
Dept: HOSPITAL 97 - ER | Age: 58
LOS: 2 days | Discharge: HOME | DRG: 310 | End: 2018-09-02
Attending: INTERNAL MEDICINE | Admitting: INTERNAL MEDICINE
Payer: COMMERCIAL

## 2018-08-31 VITALS — BODY MASS INDEX: 37.9 KG/M2

## 2018-08-31 DIAGNOSIS — E78.5: ICD-10-CM

## 2018-08-31 DIAGNOSIS — I10: ICD-10-CM

## 2018-08-31 DIAGNOSIS — N18.2: ICD-10-CM

## 2018-08-31 DIAGNOSIS — I48.2: ICD-10-CM

## 2018-08-31 DIAGNOSIS — F17.210: ICD-10-CM

## 2018-08-31 DIAGNOSIS — Z79.01: ICD-10-CM

## 2018-08-31 DIAGNOSIS — I48.92: Primary | ICD-10-CM

## 2018-08-31 LAB
ALBUMIN SERPL BCP-MCNC: 3.6 G/DL (ref 3.4–5)
ALP SERPL-CCNC: 162 U/L (ref 45–117)
ALT SERPL W P-5'-P-CCNC: 41 U/L (ref 12–78)
AST SERPL W P-5'-P-CCNC: 38 U/L (ref 15–37)
BUN BLD-MCNC: 11 MG/DL (ref 7–18)
GLUCOSE SERPLBLD-MCNC: 126 MG/DL (ref 74–106)
HCT VFR BLD CALC: 46.8 % (ref 39.6–49)
INR BLD: 1.27
LYMPHOCYTES # SPEC AUTO: 1.9 K/UL (ref 0.7–4.9)
MAGNESIUM SERPL-MCNC: 2.2 MG/DL (ref 1.8–2.4)
MCH RBC QN AUTO: 31.7 PG (ref 27–35)
MCV RBC: 93 FL (ref 80–100)
NT-PROBNP SERPL-MCNC: 731 PG/ML (ref ?–125)
PMV BLD: 9.9 FL (ref 7.6–11.3)
POTASSIUM SERPL-SCNC: 4 MMOL/L (ref 3.5–5.1)
RBC # BLD: 5.04 M/UL (ref 4.33–5.43)

## 2018-08-31 PROCEDURE — 83880 ASSAY OF NATRIURETIC PEPTIDE: CPT

## 2018-08-31 PROCEDURE — 93005 ELECTROCARDIOGRAM TRACING: CPT

## 2018-08-31 PROCEDURE — 80076 HEPATIC FUNCTION PANEL: CPT

## 2018-08-31 PROCEDURE — 85025 COMPLETE CBC W/AUTO DIFF WBC: CPT

## 2018-08-31 PROCEDURE — 85610 PROTHROMBIN TIME: CPT

## 2018-08-31 PROCEDURE — 80048 BASIC METABOLIC PNL TOTAL CA: CPT

## 2018-08-31 PROCEDURE — 99285 EMERGENCY DEPT VISIT HI MDM: CPT

## 2018-08-31 PROCEDURE — 71045 X-RAY EXAM CHEST 1 VIEW: CPT

## 2018-08-31 PROCEDURE — 36415 COLL VENOUS BLD VENIPUNCTURE: CPT

## 2018-08-31 PROCEDURE — 84484 ASSAY OF TROPONIN QUANT: CPT

## 2018-08-31 PROCEDURE — 81003 URINALYSIS AUTO W/O SCOPE: CPT

## 2018-08-31 PROCEDURE — 83735 ASSAY OF MAGNESIUM: CPT

## 2018-08-31 RX ADMIN — APIXABAN SCH MG: 5 TABLET, FILM COATED ORAL at 20:53

## 2018-08-31 RX ADMIN — Medication SCH ML: at 20:53

## 2018-08-31 RX ADMIN — ACETAMINOPHEN PRN MG: 500 TABLET, FILM COATED ORAL at 17:27

## 2018-08-31 RX ADMIN — ASPIRIN SCH MG: 81 TABLET, COATED ORAL at 09:00

## 2018-08-31 RX ADMIN — SOTALOL HYDROCHLORIDE SCH: 80 TABLET ORAL at 15:56

## 2018-08-31 RX ADMIN — Medication SCH ML: at 09:00

## 2018-08-31 RX ADMIN — APIXABAN SCH MG: 5 TABLET, FILM COATED ORAL at 09:00

## 2018-08-31 RX ADMIN — SOTALOL HYDROCHLORIDE SCH MG: 80 TABLET ORAL at 14:20

## 2018-08-31 RX ADMIN — ATORVASTATIN CALCIUM SCH MG: 10 TABLET, FILM COATED ORAL at 20:53

## 2018-08-31 NOTE — ER
Nurse's Notes                                                                                     

 [facility]                                                                

Name: Damian Briggs                                                                                 

Age: 57 yrs                                                                                       

Sex: Male                                                                                         

: 1960                                                                                   

MRN: [id number]                                                                                   

Arrival Date: 2018                                                                          

Time: 05:15                                                                                       

Account#: [id number]                                                                            

Bed 4                                                                                             

Private MD: Amaury Marie R                                                                       

Diagnosis: Palpitations;Chest Pain;shortness of breath;A fib RVR                                  

                                                                                                  

Presentation:                                                                                     

                                                                                             

05:10 Presenting complaint: Patient states: that he got up to get ready for work and started  fc  

      to have chest pain on the left side. He then passed out. When he got here pt was short      

      of breath, nauseated and diaphoretic along with his chest pain. Transition of care:         

      patient was not received from another setting of care. Onset of symptoms was 2018 at 04:30. Risk Assessment: Do you want to hurt yourself or someone else? Patient       

      reports no desire to harm self or others. Initial Sepsis Screen: Does the patient meet      

      any 2 criteria? RR > 20 per min. HR > 90 bpm. Yes Does the patient have a suspected         

      source of infection? No. Patient's initial sepsis screen is negative. Care prior to         

      arrival: None.                                                                              

05:10 Method Of Arrival: Wheelchair                                                           fc  

05:10 Acuity: LIYA 2                                                                           fc  

                                                                                                  

Historical:                                                                                       

- Allergies:                                                                                      

05:56 No Known Allergies;                                                                     fc  

- Home Meds:                                                                                      

05:56 atorvastatin 10 mg oral tab 1 tab nightly [Active]; Eliquis 5 mg oral tab 1 tab 2 times fc  

      per day [Active]; metoprolol tartrate 25 mg Oral tab 1 tab 2 times per day [Active];        

- PMHx:                                                                                           

05:56 Hypertension; Atrial Fib; High Cholesterol;                                             fc  

                                                                                                  

- Immunization history:: Last tetanus immunization: unknown.                                      

- Social history:: Smoking status: Patient uses tobacco products, smokes one pack                 

  cigarettes per day.                                                                             

- Ebola Screening: : Patient negative for fever greater than or equal to 101.5 degrees            

  Fahrenheit, and additional compatible Ebola Virus Disease symptoms Patient denies               

  exposure to infectious person Patient denies travel to an Ebola-affected area in the            

  21 days before illness onset.                                                                   

- Family history:: not pertinent.                                                                 

- Hospitalizations: : No recent hospitalization is reported.                                      

                                                                                                  

                                                                                                  

Screenin:10 Abuse screen: Denies threats or abuse. Nutritional screening: No deficits noted.        fc  

      Tuberculosis screening: No symptoms or risk factors identified. Fall Risk None              

      identified.                                                                                 

                                                                                                  

Assessment:                                                                                       

05:15 General: Appears distressed, uncomfortable, Behavior is cooperative, appropriate for    aa1 

      age. Pain: Denies pain. Neuro: Level of Consciousness is awake, alert, obeys commands,      

      Oriented to person, place, time, situation, Speech is normal. Cardiovascular: Heart         

      tones present Pulses are palpable in right radial artery and left radial artery.            

      Respiratory: Reports shortness of breath at rest Airway is patent Respiratory effort is     

      pursed lip, Respiratory pattern is tachypnea. GI: No signs and/or symptoms were             

      reported involving the gastrointestinal system. : No signs and/or symptoms were           

      reported regarding the genitourinary system. EENT: No signs and/or symptoms were            

      reported regarding the EENT system. Derm: Skin is intact, is healthy with good turgor,      

      Skin is diaphoretic, Skin is normal, Skin temperature is warm. Musculoskeletal:             

      Capillary refill < 3 seconds.                                                               

05:19 Reassessment: Dr. Orellana at bedside with pt \T\ pt's wife. Will attempt cardioversion via aa1

      adenosine.                                                                                  

06:04 Reassessment: Patient appears in no apparent distress at this time. Patient and/or      aa1 

      family updated on plan of care and expected duration. Pain level reassessed. Patient is     

      alert, oriented x 3, equal unlabored respirations, skin warm/dry/pink. Pt reports           

      symptoms markedly improved. No longer diaphoretic Patient states feeling better.            

      Patient states symptoms have improved. General: Appears comfortable, Behavior is calm.      

      Cardiovascular: Heart tones S1 S2 present Rhythm is atrial fibrillation with rapid          

      ventricular response.                                                                       

06:42 Reassessment: Patient appears in no apparent distress at this time. No changes from     aa1 

      previously documented assessment. Patient and/or family updated on plan of care and         

      expected duration. Pain level reassessed. Patient is alert, oriented x 3, equal             

      unlabored respirations, skin warm/dry/pink. Bed assignment received; pt to go up after      

      shift change.                                                                               

07:15 Reassessment: Patient appears in no apparent distress at this time. Patient and/or      cc3 

      family updated on plan of care and expected duration. Pain level reassessed. Patient is     

      alert, oriented x 3, equal unlabored respirations, skin warm/dry/pink. received from        

      night shift with ongoing Diltiazem infusion at 5 mg/hr at the left antecubital area         

      intravenous cannula, infusing well. Noted on oxygen therapy by nasal cannula at 2 LPM       

      saturating 98%, not in distress. Patient denies pain at this time. Patient states           

      feeling better. Patient states symptoms have improved.                                      

08:15 Reassessment: Patient appears in no apparent distress at this time. Patient and/or      hb  

      family updated on plan of care and expected duration. Pain level reassessed. Patient is     

      alert, oriented x 3, equal unlabored respirations, skin warm/dry/pink.                      

09:30 Reassessment: Patient appears in no apparent distress at this time. Patient and/or      cc3 

      family updated on plan of care and expected duration. Pain level reassessed. Patient is     

      alert, oriented x 3, equal unlabored respirations, skin warm/dry/pink. Patient denies       

      pain at this time.                                                                          

                                                                                                  

Vital Signs:                                                                                      

05:10  / 107; Pulse 273; Resp 24; Temp 98.1(O); Pulse Ox 100% on R/A; Weight 108.86 kg  fc  

      (R); Height 5 ft. 7 in. (170.18 cm) (R); Pain 10/10;                                        

05:32  / 89; Pulse 164; Resp 24; Pulse Ox 98% on R/A;                                   fc  

05:38  / 78; Pulse 126; Resp 20; Pulse Ox 96% on R/A;                                   fc  

05:54  / 78; Pulse 135; Resp 20; Pulse Ox 100% on R/A; Pain 0/10;                       aa1 

06:45  / 93; Pulse 137; Resp 18; Pulse Ox 100% on R/A; Pain 0/10;                       aa1 

07:35  / 85; Pulse 136; Resp 15; Pulse Ox 98% on R/A;                                   sv  

08:30  / 93; Pulse 135; Resp 24; Pulse Ox 98% on R/A; Pain 0/10;                        hb  

09:15  / 87; Pulse 136; Resp 22 S; Pulse Ox 99% on 2 lpm NC; Pain 0/10;                 cc3 

05:10 Body Mass Index 37.59 (108.86 kg, 170.18 cm)                                              

                                                                                                  

ED Course:                                                                                        

05:10 Arm band placed on Patient placed in an exam room, on a stretcher, on cardiac monitor,  fc  

      on pulse oximetry.                                                                          

05:10 Patient has correct armband on for positive identification. Placed in gown. Bed in low  fc  

      position. Call light in reach. Cardiac monitor on. Pulse ox on. NIBP on.                    

05:15 Patient arrived in ED.                                                                  es  

05:15 Amaury Marie MD is Private Physician.                                                  es  

05:15 Missed attempt(s): 20 gauge in left antecubital area. Bleeding controlled, band aid     aa1 

      applied, catheter tip intact.                                                               

05:16 Missed attempt(s): 20 gauge in right antecubital area. by claus Conn. Bleeding  aa1 

      controlled, band aid applied, catheter tip intact.                                          

05:16 Oxygen administration via nasal cannula \T\ 2L/min.                                       aa1 

05:17 EKG done, by ED staff, reviewed by Toy Orellana MD.                                   aa1 

05:18 Inserted saline lock: 20 gauge in left forearm, using aseptic technique. ,using aseptic fc  

      technique. per Mikki SMITH.                                                                  

05:30 Missed attempt(s): 22 gauge in right forearm. by Magnolia Schultz RN. Bleeding           aa1 

      controlled, band aid applied, catheter tip intact.                                          

05:40 Toy Orellana MD is Attending Physician.                                             wa  

05:40 Accessed ,peripheral vein via ultrasound, utilizing static ultrasound technique using   aa1 

      20G Nexia IV catheter Good blood return. Flushes easily. by Magnolia Schultz RN.             

05:45 Triage completed.                                                                       fc  

05:58 XRAY Chest (1 view) Sent.                                                               fc  

06:26 Amaury Marie MD is Hospitalizing Provider.                                             wa  

06:44 No provider procedures requiring assistance completed. Patient admitted, IV remains in  aa1 

      place.                                                                                      

08:09 Kristi Leyva is Primary Nurse.                                                      cc3 

10:04 Repeat lab(s) drawn. by me, sent to lab.                                                jb1 

                                                                                                  

Administered Medications:                                                                         

05:23 Drug: Adenosine 6 mg {Note: per Maddi RN.} Route: IVP; Site: left forearm;             fc  

05:53 Follow up: Response: No adverse reaction; No change in condition                        fc  

05:26 Drug: Adenosine 12 mg {Note: per Maddi RN.} Route: IVP; Site: left forearm;            fc  

05:53 Follow up: Response: No adverse reaction; No change in condition                        fc  

05:28 Drug: Adenosine 12 mg {Note: per Maddi RN.} Route: IVP; Site: left forearm;            fc  

05:53 Follow up: Response: No adverse reaction; Other; Other - heart rate down                fc  

05:30 Drug: Lopressor 5 mg {Note: per Maddi SMITH.} Route: IVP; Site: left forearm;               

05:53 Follow up: Response: No adverse reaction; Marked relief of symptoms                     fc  

05:33 Drug: Lopressor 5 mg {Note: per Maddi RN.} Route: IVP; Site: left forearm;               

05:54 Follow up: Response: No adverse reaction; Marked relief of symptoms                     fc  

05:38 Drug: Lopressor (metoprolol TARTRATE) 50 mg Route: PO;                                  fc  

07:20 Follow up: Response: No adverse reaction                                                cc3 

05:40 Drug: Magnesium Sulfate 2 grams {Note: per Maddi SMITH.} Route: IVPB; Infused Over: 2     fc  

      hrs; Site: left forearm;                                                                    

06:30 Follow up: Response: No adverse reaction; Marked relief of symptoms; IV Status:         fc  

      Completed infusion; IV Intake: 50ml                                                         

06:22 Drug: Cardizem 20 mg Route: IVP; Site: right antecubital;                               jb4 

07:15 Follow up: Response: No adverse reaction                                                cc3 

06:35 Drug: Cardizem 5 mg/hr Route: IV; Rate: calculated rate; Site: left forearm;            aa1 

06:42 Follow up: IV Status: Infusion continued upon admission                                 aa1 

                                                                                                  

                                                                                                  

Intake:                                                                                           

06:30 IV: 50ml; Total: 50ml.                                                                    

                                                                                                  

Outcome:                                                                                          

06:28 Decision to Hospitalize by Provider.                                                    wa  

12:48 Patient left the ED.                                                                    sv  

                                                                                                  

Signatures:                                                                                       

Víctor Baltazar                                 jbKarissa Harris RN RN                                                      

Maddi Silva RN RN   aa1                                                  

Izzy Hannon Felicia, RN RN                                                      

Gertrudis Guidry RN RN hb Bryson, James, RN RN   jb4                                                  

Toy Orellana MD MD wa Cordel, Charlene                             cc3                                                  

                                                                                                  

Corrections: (The following items were deleted from the chart)                                    

05:59 05:15 Missed attempt(s): 20 gauge in right antecubital area. Bleeding controlled, band  aa1 

      aid applied, catheter tip intact. aa1                                                       

08:13 07:15 Reassessment: Patient appears in no apparent distress at this time. Patient       cc3 

      and/or family updated on plan of care and expected duration. Pain level reassessed.         

      Patient is alert, oriented x 3, equal unlabored respirations, skin warm/dry/pink.           

      received from night shift with ongoing Diltiazem infusion at 5 mg/hr at the left            

      antecubital area intravenous cannula, infusing well. Patient denies pain at this time.      

      Patient states feeling better. Patient states symptoms have improved. cc3                   

                                                                                                  

**************************************************************************************************

## 2018-08-31 NOTE — EDPHYS
Physician Documentation                                                                           

 [facility]                                                                

Name: Damian Briggs                                                                                 

Age: 57 yrs                                                                                       

Sex: Male                                                                                         

: 1960                                                                                   

MRN: [id number]                                                                                   

Arrival Date: 2018                                                                          

Time: 05:15                                                                                       

Account#: [id number]                                                                            

Bed 4                                                                                             

Private MD: Amaury Marie R                                                                       

ED Physician Toy Orellana                                                                      

HPI:                                                                                              

                                                                                             

06:29 This 57 yrs old Black Male presents to ER via Wheelchair with complaints of             wa  

      Palpitations.                                                                               

06:29 The patient presents with a history of irregular heart beat, heart racing. Context: The wa  

      symptoms occur at rest, recently d/c'd from here for same. on eloquis and a                 

      beta-blocker.. Onset: The symptoms/episode began/occurred 4:30 Am this morning.             

      Duration: The patient or guardian reports a single episode, that is still ongoing, and      

      worsening. Modifying factors: The symptoms are aggravated by nothing. The symptoms are      

      alleviated by nothing. Associated signs and symptoms: Pertinent positives: chest pain,      

      SOB, dizziness. Severity of symptoms: At their worst the symptoms were moderate in the      

      emergency department the symptoms are worse markedly. The patient has experienced a         

      previous episode, The patient has experienced similar episodes in the past, recent d/c      

      for same. The patient has been recently seen by a physician:. sent home with blood          

      thinners to f/u with cardiology.                                                            

                                                                                                  

Historical:                                                                                       

- Allergies:                                                                                      

05:56 No Known Allergies;                                                                     fc  

- Home Meds:                                                                                      

05:56 atorvastatin 10 mg oral tab 1 tab nightly [Active]; Eliquis 5 mg oral tab 1 tab 2 times fc  

      per day [Active]; metoprolol tartrate 25 mg Oral tab 1 tab 2 times per day [Active];        

- PMHx:                                                                                           

05:56 Hypertension; Atrial Fib; High Cholesterol;                                             fc  

                                                                                                  

- Immunization history:: Last tetanus immunization: unknown.                                      

- Social history:: Smoking status: Patient uses tobacco products, smokes one pack                 

  cigarettes per day.                                                                             

- Ebola Screening: : Patient negative for fever greater than or equal to 101.5 degrees            

  Fahrenheit, and additional compatible Ebola Virus Disease symptoms Patient denies               

  exposure to infectious person Patient denies travel to an Ebola-affected area in the            

  21 days before illness onset.                                                                   

- Family history:: not pertinent.                                                                 

- Hospitalizations: : No recent hospitalization is reported.                                      

                                                                                                  

                                                                                                  

ROS:                                                                                              

06:41 Constitutional: Negative for fever, chills, and weight loss, Eyes: Negative for injury, wa  

      pain, redness, and discharge, ENT: Negative for injury, pain, and discharge, Neck:          

      Negative for injury, pain, and swelling, Abdomen/GI: Negative for abdominal pain,           

      nausea, vomiting, diarrhea, and constipation, Back: Negative for injury and pain, :       

      Negative for injury, bleeding, discharge, and swelling, MS/Extremity: Negative for          

      injury and deformity, Skin: Negative for injury, rash, and discoloration, Neuro:            

      Negative for headache, weakness, numbness, tingling, and seizure, Psych: Negative for       

      depression, anxiety, suicide ideation, homicidal ideation, and hallucinations.              

06:41 Cardiovascular: Positive for chest pain, palpitations, Negative for orthopnea,              

      paroxysmal nocturnal dyspnea.                                                               

06:41 Respiratory: Positive for shortness of breath, at rest. Negative for cough, hemoptysis.     

                                                                                                  

Exam:                                                                                             

06:42 Constitutional:  This is a well developed, well nourished patient who is awake, alert,  wa  

      and in no acute distress. Head/Face:  Normocephalic, atraumatic. Eyes:  Pupils equal        

      round and reactive to light, extra-ocular motions intact.  Lids and lashes normal.          

      Conjunctiva and sclera are non-icteric and not injected.  Cornea within normal limits.      

      Periorbital areas with no swelling, redness, or edema. ENT:  Nares patent. No nasal         

      discharge, no septal abnormalities noted.  Tympanic membranes are normal and external       

      auditory canals are clear.  Oropharynx with no redness, swelling, or masses, exudates,      

      or evidence of obstruction, uvula midline.  Mucous membranes moist. Neck:  Trachea          

      midline, no thyromegaly or masses palpated, and no cervical lymphadenopathy.  Supple,       

      full range of motion without nuchal rigidity, or vertebral point tenderness.  No            

      Meningismus. Chest/axilla:  Normal chest wall appearance and motion.  Nontender with no     

      deformity.  No lesions are appreciated. Abdomen/GI:  Soft, non-tender, with normal          

      bowel sounds.  No distension or tympany.  No guarding or rebound.  No evidence of           

      tenderness throughout. Back:  No spinal tenderness.  No costovertebral tenderness.          

      Full range of motion. Skin:  Warm, dry with normal turgor.  Normal color with no            

      rashes, no lesions, and no evidence of cellulitis. MS/ Extremity:  Pulses equal, no         

      cyanosis.  Neurovascular intact.  Full, normal range of motion. Neuro:  Awake and           

      alert, GCS 15, oriented to person, place, time, and situation.  Cranial nerves II-XII       

      grossly intact.  Motor strength 5/5 in all extremities.  Sensory grossly intact.            

      Cerebellar exam normal.  Normal gait. Psych:  Awake, alert, with orientation to person,     

      place and time.  Behavior, mood, and affect are within normal limits.                       

06:42 Cardiovascular: Rate: tachycardic, Rhythm: irregular, Pulses: no pulse deficits are         

      appreciated, Heart sounds: normal, Edema: is not appreciated, JVD: is not appreciated.      

06:42 Respiratory: the patient does not display signs of respiratory distress,  Respirations:     

      normal, Breath sounds: are clear throughout, Respiratory rate:  normal                      

                                                                                                  

Vital Signs:                                                                                      

05:10  / 107; Pulse 273; Resp 24; Temp 98.1(O); Pulse Ox 100% on R/A; Weight 108.86 kg  fc  

      (R); Height 5 ft. 7 in. (170.18 cm) (R); Pain 10/10;                                        

05:32  / 89; Pulse 164; Resp 24; Pulse Ox 98% on R/A;                                   fc  

05:38  / 78; Pulse 126; Resp 20; Pulse Ox 96% on R/A;                                   fc  

05:54  / 78; Pulse 135; Resp 20; Pulse Ox 100% on R/A; Pain 0/10;                       aa1 

06:45  / 93; Pulse 137; Resp 18; Pulse Ox 100% on R/A; Pain 0/10;                       aa1 

07:35  / 85; Pulse 136; Resp 15; Pulse Ox 98% on R/A;                                   sv  

08:30  / 93; Pulse 135; Resp 24; Pulse Ox 98% on R/A; Pain 0/10;                        hb  

09:15  / 87; Pulse 136; Resp 22 S; Pulse Ox 99% on 2 lpm NC; Pain 0/10;                 cc3 

05:10 Body Mass Index 37.59 (108.86 kg, 170.18 cm)                                              

                                                                                                  

MDM:                                                                                              

05:40 Patient medically screened.                                                             wa  

06:43 Differential diagnosis: arrythmia, dehydration, stress disorder. Data reviewed: vital   wa  

      signs, nurses notes, lab test result(s), EKG, radiologic studies.                           

06:45 Test interpretation: by ED physician or midlevel provider: labs noted for elevated Cr   wa  

      1.6 and BNP of 781.                                                                         

06:45 Test interpretation: by ED physician or midlevel provider: CXR: no acute process.       wa  

      Response to treatment: the patient's symptoms have mildly improved after treatment, HR      

      now in the 130's on a drip with cardizem. admit to Dr. Marie. cardiology consult.           

      Physician consultation: Amaury Marie MD. Admission orders: after a detailed discussion       

      of the patient's condition and case, the admit orders are written by me.                    

                                                                                                  

                                                                                             

05:41 Order name: Basic Metabolic Panel; Complete Time: 06:44                                 wa  

                                                                                             

05:41 Order name: CBC with Diff; Complete Time: 06:44                                         wa  

                                                                                             

05:41 Order name: LFT's; Complete Time: 06:44                                                 wa  

                                                                                             

05:41 Order name: Magnesium; Complete Time: 06:45                                             wa  

                                                                                             

05:41 Order name: NT PRO-BNP; Complete Time: 06:44                                            wa  

                                                                                             

05:41 Order name: PT-INR; Complete Time: 06:44                                                wa  

                                                                                             

05:41 Order name: Troponin (emerg Dept Use Only); Complete Time: 06:45                        wa  

                                                                                             

05:41 Order name: XRAY Chest (1 view)                                                         wa  

                                                                                             

06:12 Order name: Urine Dipstick--Ancillary (enter results)                                   [facility] 

                                                                                             

09:13 Order name: RAD                                                                         [facility]

                                                                                             

10:36 Order name: Troponin I                                                                  [facility]

                                                                                             

05:41 Order name: EKG; Complete Time: 05:41                                                   wa  

                                                                                             

05:41 Order name: Cardiac monitoring; Complete Time: 05:49                                    wa  

                                                                                             

05:41 Order name: EKG - Nurse/Tech; Complete Time: 05:49                                      wa  

                                                                                             

05:41 Order name: IV Saline Lock; Complete Time: 05:49                                        wa  

                                                                                             

05:41 Order name: Labs collected and sent; Complete Time: 05:51                               wa  

                                                                                             

05:41 Order name: O2 Per Protocol; Complete Time: 05:49                                       wa  

                                                                                             

05:41 Order name: O2 Sat Monitoring; Complete Time: 05:49                                     wa  

                                                                                             

05:41 Order name: Urine Dipstick-Ancillary (obtain specimen); Complete Time: 06:42            wa  

                                                                                             

06:44 Order name: EKG; Complete Time: 06:45                                                   eb  

                                                                                             

06:44 Order name: EKG - Nurse/Tech; Complete Time: 06:45                                      eb  

                                                                                                  

Administered Medications:                                                                         

05:23 Drug: Adenosine 6 mg {Note: per Maddi RN.} Route: IVP; Site: left forearm;             fc  

05:53 Follow up: Response: No adverse reaction; No change in condition                        fc  

05:26 Drug: Adenosine 12 mg {Note: per Maddi RN.} Route: IVP; Site: left forearm;            fc  

05:53 Follow up: Response: No adverse reaction; No change in condition                        fc  

05:28 Drug: Adenosine 12 mg {Note: per Maddi RN.} Route: IVP; Site: left forearm;            fc  

05:53 Follow up: Response: No adverse reaction; Other; Other - heart rate down                fc  

05:30 Drug: Lopressor 5 mg {Note: per Maddi RN.} Route: IVP; Site: left forearm;             fc  

05:53 Follow up: Response: No adverse reaction; Marked relief of symptoms                     fc  

05:33 Drug: Lopressor 5 mg {Note: per Maddi RN.} Route: IVP; Site: left forearm;             fc  

05:54 Follow up: Response: No adverse reaction; Marked relief of symptoms                     fc  

05:38 Drug: Lopressor (metoprolol TARTRATE) 50 mg Route: PO;                                  fc  

07:20 Follow up: Response: No adverse reaction                                                cc3 

05:40 Drug: Magnesium Sulfate 2 grams {Note: per Maddi RN.} Route: IVPB; Infused Over: 2     fc  

      hrs; Site: left forearm;                                                                    

06:30 Follow up: Response: No adverse reaction; Marked relief of symptoms; IV Status:         fc  

      Completed infusion; IV Intake: 50ml                                                         

06:22 Drug: Cardizem 20 mg Route: IVP; Site: right antecubital;                               jb4 

07:15 Follow up: Response: No adverse reaction                                                cc3 

06:35 Drug: Cardizem 5 mg/hr Route: IV; Rate: calculated rate; Site: left forearm;            aa1 

06:42 Follow up: IV Status: Infusion continued upon admission                                 aa1 

                                                                                                  

                                                                                                  

Disposition:                                                                                      

18 06:28 Hospitalization ordered by Amaury Marie for Inpatient Admission. Preliminary       

  diagnosis are Palpitations, Chest Pain, shortness of breath, A fib RVR.                         

- Bed requested for Intensive Care Unit.                                                          

- Status is Inpatient Admission.                                                              sv  

- Condition is Fair.                                                                              

- Problem is new.                                                                                 

- Symptoms have improved.                                                                         

UTI on Admission? No                                                                              

                                                                                                  

                                                                                                  

                                                                                                  

Critical care time excluding procedures:                                                          

06:26 Critical care time: Bedside Care: 25 minutes, Consultation: 10 minutes, Family          wa  

      Intervention: 10 minutes. Total time: 45 minutes                                            

                                                                                                  

Signatures:                                                                                       

Dispatcher [employer]                           Kathleen Hahn RN                    RN   dm5                                                  

Karissa Tong RN RN   sv                                                   

Sheba Bernard RN                        SARAH                                                      

Maddi Silva RN                        RN   aa1                                                  

Zuleyka Hawkins RN RN                                                      

Naida Bergman RN RN                                                      

Mack Hinojosa RN RN   jb4                                                  

Toy Orellana MD MD wa Botello, Elizabeth eb Cordel, Charlene                             cc3                                                  

                                                                                                  

Corrections: (The following items were deleted from the chart)                                    

06:34 06:28 Hospitalization Ordered by Amaury Marie MD for Inpatient Admission. Preliminary      

      diagnosis is Palpitations; Chest Pain; shortness of breath; A fib RVR. Bed requested        

      for Telemetry/MedSurg (Inpatient). Status is Inpatient Admission. Condition is Fair.        

      Problem is new. Symptoms have improved. UTI on Admission? No. wa                            

07:08 06:34 2018 06:28 Hospitalization Ordered by Amaury Marie MD for Inpatient          eb  

      Admission. Preliminary diagnosis is Palpitations; Chest Pain; shortness of breath; A        

      fib RVR. Bed requested for Telemetry/MedSurg (Inpatient). Status is Inpatient               

      Admission. Condition is Fair. Problem is new. Symptoms have improved. UTI on Admission?     

      No. mw                                                                                      

09:51 07:08 2018 06:28 Hospitalization Ordered by Amaury Marie MD for Inpatient          eb  

      Admission. Preliminary diagnosis is Palpitations; Chest Pain; shortness of breath; A        

      fib RVR. Bed requested for Intensive Care Unit. Status is Inpatient Admission.              

      Condition is Fair. Problem is new. Symptoms have improved. UTI on Admission? No. eb         

10:19 09:51 2018 06:28 Hospitalization Ordered by Amaury Marie MD for Inpatient          ss  

      Admission. Preliminary diagnosis is Palpitations; Chest Pain; shortness of breath; A        

      fib RVR. Bed requested for [facility] ER HOLD. Status is Inpatient Admission. Condition is        

      Fair. Problem is new. Symptoms have improved. UTI on Admission? No. eb                      

11:22 10:19 2018 06:28 Hospitalization Ordered by Amaury Marie MD for Inpatient          dm5 

      Admission. Preliminary diagnosis is Palpitations; Chest Pain; shortness of breath; A        

      fib RVR. Bed requested for [facility] ER HOLD. Status is Inpatient Admission. Condition is        

      Fair. Problem is new. Symptoms have improved. UTI on Admission? No. ss                      

12:48 11:22 2018 06:28 Hospitalization Ordered by Amaury Marie MD for Inpatient          sv  

      Admission. Preliminary diagnosis is Palpitations; Chest Pain; shortness of breath; A        

      fib RVR. Bed requested for Intensive Care Unit. Status is Inpatient Admission.              

      Condition is Fair. Problem is new. Symptoms have improved. UTI on Admission? No. dm5        

                                                                                                  

**************************************************************************************************

## 2018-08-31 NOTE — RAD REPORT
EXAM DESCRIPTION:  RAD - Chest Single View - 8/31/2018 7:10 am

 

CLINICAL HISTORY:  SOB

Chest pain.

 

COMPARISON:  Chest Single View dated 8/21/2018

 

FINDINGS:  Portable technique limits examination quality.

 

The lungs are grossly clear. The heart is upper limit of normal in size. No displaced fractures.

 

IMPRESSION:  No acute intrathoracic process suspected.

## 2018-09-01 RX ADMIN — METOPROLOL TARTRATE SCH MG: 5 INJECTION INTRAVENOUS at 07:02

## 2018-09-01 RX ADMIN — SOTALOL HYDROCHLORIDE SCH MG: 80 TABLET ORAL at 05:33

## 2018-09-01 RX ADMIN — ACETAMINOPHEN PRN MG: 500 TABLET, FILM COATED ORAL at 00:00

## 2018-09-01 RX ADMIN — ATORVASTATIN CALCIUM SCH MG: 10 TABLET, FILM COATED ORAL at 20:31

## 2018-09-01 RX ADMIN — SOTALOL HYDROCHLORIDE SCH MG: 80 TABLET ORAL at 14:03

## 2018-09-01 RX ADMIN — Medication SCH ML: at 20:31

## 2018-09-01 RX ADMIN — METOPROLOL TARTRATE SCH MG: 5 INJECTION INTRAVENOUS at 07:07

## 2018-09-01 RX ADMIN — ASPIRIN SCH MG: 81 TABLET, COATED ORAL at 09:30

## 2018-09-01 RX ADMIN — Medication SCH ML: at 11:08

## 2018-09-01 RX ADMIN — APIXABAN SCH MG: 5 TABLET, FILM COATED ORAL at 09:30

## 2018-09-01 RX ADMIN — APIXABAN SCH MG: 5 TABLET, FILM COATED ORAL at 20:31

## 2018-09-01 RX ADMIN — METOPROLOL TARTRATE SCH MG: 5 INJECTION INTRAVENOUS at 07:18

## 2018-09-01 NOTE — EKG
Test Date:    2018-08-31               Test Time:    05:33:28

Technician:                                          

                                                     

MEASUREMENT RESULTS:                                       

Intervals:                                           

Rate:         143                                    

AZ:           246                                    

QRSD:         74                                     

QT:           166                                    

QTc:          256                                    

Axis:                                                

P:            38                                     

AZ:           246                                    

QRS:          14                                     

T:            261                                    

                                                     

INTERPRETIVE STATEMENTS:                                       

                                                     

atrial flutter wit 2;1

Biatrial enlargement

Left ventricular hypertrophy with repolarization abnormality

Abnormal ECG

Compared to ECG 08/31/2018 05:13:33

Atrial premature complex(es) now present

First degree AV block now present

Atrial abnormality now present

Left ventricular hypertrophy now present

Early repolarization now present

Supraventricular tachycardia no longer present

ST (T wave) deviation no longer present



Electronically Signed On 09-01-18 06:07:21 CDT by Fish Neumann

## 2018-09-01 NOTE — HP
Date of Admission:  08/31/2018



Chief Complaint:  Atrial fibrillation, syncope.



History Of Present Illness:  This patient started having dizziness, syncope, and chest pain.  He also
 had nausea and diaphoresis.  At this point, he was brought to the emergency room.  The patient is kn
own to have chronic atrial fibrillation.  Currently, he is on beta blocker, metoprolol as well as Caitlin
tico.  The patient was in the hospital last week.  The patient is due to be followed by [facility].  The patient denied any history of seizures.  No history of fever, chills, or rigors.



Past Medical History:  Positive for atrial fibrillation, hyperlipidemia, hypertension.



Family History:  Noncontributory.



Personal History:  The patient currently does not drink any alcohol.



Home Medicines:  Lipitor, Eliquis, metoprolol.



Review of Systems:

No history of fever, chills, or rigors.



Physical Examination:

General:  Revealed a 57-year-old black male, fully alert and oriented. 

Vital Signs:  Blood pressure 140/90, heart rate 116. 

HEENT:  Otherwise negative. 

Neck:  Supple.  JVD negative. 

Chest:  Clear. 

Heart:  Irregular.  Tachycardia noted. 

Abdomen:  Soft. 

Extremities:  No edema.



Laboratory:  EKG:  Atrial fibrillation with rapid ventricular response.  White count 11,000.  Chem pr
ofile:  Creatinine of 1.6, BUN normal, alkaline phosphatase 162.  Troponin 0.07.  .  Echocardi
ogram done during the last visit showed mild global hypokinesia.



Assessment:  

1.Atrial fibrillation with rapid ventricular response.

2.Chronic atrial fibrillation.

3.Hypertension.

4.Hyperlipidemia.

5.Left ventricular dysfunction as documented on echocardiogram during the last visit.



Plan:  The patient currently is on Cardizem drip.  The patient is being seen by Cardiology Service.  
The patient is started on Lipitor and Eliquis, which he was at home.  Further management of the patie
nt depends on suggestions from Cardiology Service.  The patient currently fully stable.





FRANCIS/HARSHA

DD:  08/31/2018 17:10:45Voice ID:  [id number]

## 2018-09-01 NOTE — EKG
Test Date:    2018-08-31               Test Time:    05:13:33

Technician:                                          

                                                     

MEASUREMENT RESULTS:                                       

Intervals:                                           

Rate:         271                                    

NC:                                                  

QRSD:         66                                     

QT:           148                                    

QTc:          314                                    

Axis:                                                

P:                                                   

NC:                                                  

QRS:          35                                     

T:            252                                    

                                                     

INTERPRETIVE STATEMENTS:                                       

                                                     

Supraventricular tachycardia

Marked ST abnormality, possible inferolateral subendocardial injury

Abnormal ECG

Compared to ECG 08/22/2018 00:10:18

ST (T wave) deviation now present

Atrial flutter no longer present

Left ventricular hypertrophy no longer present

Early repolarization no longer present

Myocardial infarct finding no longer present



Electronically Signed On 09-01-18 06:07:27 CDT by Fish Neumann

## 2018-09-02 VITALS — OXYGEN SATURATION: 98 %

## 2018-09-02 VITALS — SYSTOLIC BLOOD PRESSURE: 137 MMHG | DIASTOLIC BLOOD PRESSURE: 79 MMHG | TEMPERATURE: 98.4 F

## 2018-09-02 LAB
BUN BLD-MCNC: 17 MG/DL (ref 7–18)
GLUCOSE SERPLBLD-MCNC: 104 MG/DL (ref 74–106)
MAGNESIUM SERPL-MCNC: 2.1 MG/DL (ref 1.8–2.4)
POTASSIUM SERPL-SCNC: 4.1 MMOL/L (ref 3.5–5.1)

## 2018-09-02 PROCEDURE — 5A2204Z RESTORATION OF CARDIAC RHYTHM, SINGLE: ICD-10-PCS

## 2018-09-02 RX ADMIN — APIXABAN SCH MG: 5 TABLET, FILM COATED ORAL at 11:29

## 2018-09-02 RX ADMIN — ASPIRIN SCH MG: 81 TABLET, COATED ORAL at 11:28

## 2018-09-02 RX ADMIN — SOTALOL HYDROCHLORIDE SCH MG: 80 TABLET ORAL at 03:56

## 2018-09-02 RX ADMIN — Medication SCH ML: at 08:00

## 2018-09-02 RX ADMIN — SOTALOL HYDROCHLORIDE SCH MG: 80 TABLET ORAL at 13:48

## 2018-09-02 NOTE — OP
Date of Procedure:  09/02/2018



Surgeon:  Fish Neumann MD



Procedure:  Direct current cardioversion.



Indication:  Atrial flutter.



History Of Present Illness:  Mr. Briggs had been in the hospital for about 48 hours after recurrent atr
ial flutter at a rapid rate, unresponsive to IV beta-blockers, and p.o. beta-blockers.  He had been t
aking Eliquis at home.  Had a negative Cardiolite in 2016.  Echocardiogram showed an ejection fractio
n of 50%.  Has a history of dyslipidemia.  He remained symptomatic with some shortness of breath, aty
pical chest pain, rapid atrial flutter despite 4 dosages of Betapace 80 mg 1 p.o. b.i.d.  QT was not 
prolonged.  He was set up today for a cardioversion, was n.p.o.  Received a total of 13 mg of Versed 
for IV sedation.  He received 1 shock of 100 joules, but converted him to sinus bradycardia.  The pat
ient tolerated the procedure well.  There were no complications.  Total conscious sedation was 30 min
utes.



Final Diagnosis:  Atrial flutter status post successful cardioversion.



Plan:  Plan is for the patient to go home whenever he wakes up.  He will be going home on aspirin, so
talol 80 b.i.d., Eliquis, and I believe he takes his statin at home, and I will see him in the office
 in 2 days.





NB/HARSHA

DD:  09/02/2018 08:34:03Voice ID:  [id number]

DT:  09/02/2018 19:11:50Report ID:  [id number]

## 2018-09-02 NOTE — CON
Date of Consultation:  08/31/2018



Reason For Consultation:  Recurrent atrial flutter.



History Of Present Illness:  Mr. Briggs was just in the hospital recently.  He has a history of atrial 
fibrillation dating back to 3 years ago.  Was placed on medication at that time and he just stopped t
aking his medicine that was in April 2016.  At that time, he had a normal echo and normal stress test
.  He came back about 2 weeks ago with an episode of atrial fibrillation that resolved after one dose
 of beta-blocker IV.  He was kept on beta-blockers and placed on Eliquis and had an echocardiogram th
at showed an ejection fraction of 50%.  He came back to the emergency room a few days later with recu
rrent atrial flutter and was admitted for further evaluation and treatment.  He does have some slight
 chest discomfort with his atrial flutter, but no PND, orthopnea, pedal edema, or syncope.  He stated
 that he took his beta-blocker and Eliquis like he was supposed to.  He is also on Lipitor for dyslip
idemia.



Past Medical History:  Includes atrial fibrillation, hypertension, dyslipidemia.



Allergies:  NONE.



Social History:  Negative.



Family History:  Negative.



Review of Systems:

Negative.



Medications:  At home include Lipitor, Eliquis, and beta-blockers.



Physical Examination:

Vital Signs:  Stable.  He was in atrial flutter. 

HEENT:  Negative. 

Neck:  Supple with no bruit. 

Chest:  Clear. 

Cardiac:  Revealed atrial flutter.  No murmurs, gallops, or rubs. 

Abdomen:  Benign. 

Extremities:  Revealed no clubbing, cyanosis, or edema.



Diagnostic Data:  Chest x-ray was negative.  EKG showed atrial flutter.  Troponin was 0.05.  BNP was 
731.  Creatinine is 1.6.



Impression And Plan:  Recurrent atrial fibrillation.  We will give the patient some IV beta-blockers.
  I am going to put him on Betapace 80 b.i.d.  We will continue his Eliquis.  If he does not convert 
with Betapace, I will attempt cardioversion.  No need to repeat any cardiac workup at this point.  So
metime down the road, we can probably repeat the Lexiscan.  I think his elevation of BNP and troponin
 are secondary to the atrial flutter.  His other problems include hypertension that is well controlle
d, dyslipidemia well controlled, and moderate stage 2 chronic renal insufficiency.





NB/MODL

DD:  09/02/2018 06:30:07Voice ID:  [id number]

DT:  09/02/2018 11:02:36Report ID:  [id number]

## 2018-09-02 NOTE — PN
Date of Progress Note:  09/01/2018



Mr. Briggs was readmitted on 08/31/2018 for recurrent atrial fibrillation.  He is now on Betapace and E
liquis.  Heart rate is better controlled in the 130s, but he remained in atrial flutter.  He remained
 symptomatic with some shortness of breath, palpitation, and slight chest pain.  I will give him janay
tional IV beta-blocker doses today of 15 mg IV.  We will continue the Betapace at 80 b.i.d.  If he re
lillian in atrial fibrillation or flutter by 09/02/2018, we will plan a cardioversion.





NB/HARSHA

DD:  09/02/2018 06:31:51Voice ID:  [id number]

DT:  09/02/2018 11:37:14Report ID:  [id number]

## 2018-09-02 NOTE — P.DS
Admission Date: 08/31/18


Discharge Date: 09/02/18


Disposition: ROUTINE DISCHARGE


Discharge Condition: FAIR


Brief History of Present Illness: 





Patient is 57 years of age admitted with atrial fibrillation chest pain 

syncopal attack


Hospital Course: 





Patient was admitted to the hospital and was cardioverted the on a Cardizem 

drip he was then cardioverted and is currently in normal sinus rhythm be a 

blockers discontinued started on Betapace continue with anticoagulation


Vital Signs/Physical Exam: 














Temp Pulse Resp BP Pulse Ox


 


 98.1 F   129 H  23 H  138/94 H  98 


 


 09/02/18 04:00  09/02/18 06:00  09/02/18 06:00  09/02/18 06:00  09/02/18 06:00








Laboratory Data at Discharge: 














WBC  11.0 K/uL (4.3-10.9)  H D 08/31/18  05:40    


 


Hgb  16.0 g/dL (13.6-17.9)   08/31/18  05:40    


 


Hct  46.8 % (39.6-49.0)   08/31/18  05:40    


 


Plt Count  282 K/uL (152-406)   08/31/18  05:40    


 


PT  15.0 SECONDS (9.5-12.5)  H  08/31/18  05:40    


 


INR  1.27   08/31/18  05:40    


 


Sodium  142 mmol/L (136-145)   09/02/18  05:02    


 


Potassium  4.1 mmol/L (3.5-5.1)   09/02/18  05:02    


 


BUN  17 mg/dL (7-18)   09/02/18  05:02    


 


Creatinine  1.00 mg/dL (0.55-1.3)   09/02/18  05:02    


 


Glucose  104 mg/dL ()   09/02/18  05:02    


 


Magnesium  2.1 mg/dL (1.8-2.4)   09/02/18  05:02    


 


Total Bilirubin  0.4 mg/dL (0.2-1.0)   08/31/18  05:40    


 


AST  38 U/L (15-37)  H  08/31/18  05:40    


 


ALT  41 U/L (12-78)   08/31/18  05:40    


 


Alkaline Phosphatase  162 U/L ()  H  08/31/18  05:40    


 


Troponin I  0.04 ng/mL (0.0-0.045)   09/01/18  09:19    








Home Medications: 








Apixaban [Eliquis] 5 mg PO BID #60 tablet 08/22/18 


Atorvastatin Calcium [Lipitor*] 10 mg PO BEDTIME #30 tab 08/22/18 


Metoprolol Tartrate [Lopressor*] 25 mg PO BID 6AM 6PM #60 tab 08/22/18 





Diet: Regular


Activity: Ad angélica

## 2018-09-04 NOTE — EKG
Test Date:    2018-09-02               Test Time:    11:03:11

Technician:   SARAH PALM                                   

                                                     

MEASUREMENT RESULTS:                                       

Intervals:                                           

Rate:         65                                     

CA:           148                                    

QRSD:         92                                     

QT:           480                                    

QTc:          499                                    

Axis:                                                

P:            77                                     

CA:           148                                    

QRS:          39                                     

T:            20                                     

                                                     

INTERPRETIVE STATEMENTS:                                       

                                                     

Normal sinus rhythm

Right atrial enlargement

Minimal voltage criteria for LVH, may be normal variant

Prolonged QT

Abnormal ECG

Compared to ECG 08/31/2018 05:33:28

Prolonged QT interval now present

Atrial flutter no longer present

Early repolarization no longer present



Electronically Signed On 09-04-18 07:09:38 CDT by Fish Neumann

## 2018-09-04 NOTE — EKG
Test Date:    2018-09-02               Test Time:    10:58:07

Technician:   SARAH PALM                                   

                                                     

MEASUREMENT RESULTS:                                       

Intervals:                                           

Rate:         67                                     

WI:           148                                    

QRSD:         92                                     

QT:           478                                    

QTc:          505                                    

Axis:                                                

P:            74                                     

WI:           148                                    

QRS:          49                                     

T:            22                                     

                                                     

INTERPRETIVE STATEMENTS:                                       

                                                     

Normal sinus rhythm

Right atrial enlargement

RSR' or QR pattern in V1 suggests right ventricular conduction delay

Minimal voltage criteria for LVH, may be normal variant

Prolonged QT

Abnormal ECG

Compared to ECG 09/01/2018 03:09:25

Atrial abnormality now present

RSR' in V1 or V2 now present

Prolonged QT interval now present

Atrial flutter no longer present

Early repolarization no longer present



Electronically Signed On 09-04-18 12:19:26 CDT by Fish Neumann

## 2018-09-04 NOTE — EKG
Test Date:    2018-09-01               Test Time:    03:09:25

Technician:   RT                                     

                                                     

MEASUREMENT RESULTS:                                       

Intervals:                                           

Rate:         133                                    

CO:                                                  

QRSD:         82                                     

QT:           344                                    

QTc:          511                                    

Axis:                                                

P:            267                                    

CO:                                                  

QRS:          17                                     

T:            -50                                    

                                                     

INTERPRETIVE STATEMENTS:                                       

                                                     

Atrial flutter with 2:1 AV conduction

Left ventricular hypertrophy with repolarization abnormality

Abnormal ECG



Electronically Signed On 09-04-18 12:19:33 CDT by Fish Neumann

## 2018-12-19 ENCOUNTER — HOSPITAL ENCOUNTER (INPATIENT)
Dept: HOSPITAL 97 - ER | Age: 58
LOS: 1 days | Discharge: HOME | DRG: 310 | End: 2018-12-20
Attending: INTERNAL MEDICINE | Admitting: INTERNAL MEDICINE
Payer: COMMERCIAL

## 2018-12-19 VITALS — BODY MASS INDEX: 36.9 KG/M2

## 2018-12-19 DIAGNOSIS — E78.00: ICD-10-CM

## 2018-12-19 DIAGNOSIS — I48.2: ICD-10-CM

## 2018-12-19 DIAGNOSIS — I10: ICD-10-CM

## 2018-12-19 DIAGNOSIS — E78.5: ICD-10-CM

## 2018-12-19 DIAGNOSIS — Z79.01: ICD-10-CM

## 2018-12-19 DIAGNOSIS — I48.92: Primary | ICD-10-CM

## 2018-12-19 LAB
ALBUMIN SERPL BCP-MCNC: 3.2 G/DL (ref 3.4–5)
ALP SERPL-CCNC: 140 U/L (ref 45–117)
ALT SERPL W P-5'-P-CCNC: 34 U/L (ref 12–78)
AST SERPL W P-5'-P-CCNC: 33 U/L (ref 15–37)
BUN BLD-MCNC: 13 MG/DL (ref 7–18)
GLUCOSE SERPLBLD-MCNC: 152 MG/DL (ref 74–106)
HCT VFR BLD CALC: 48 % (ref 39.6–49)
INR BLD: 1.19
LYMPHOCYTES # SPEC AUTO: 3.1 K/UL (ref 0.7–4.9)
MAGNESIUM SERPL-MCNC: 2.1 MG/DL (ref 1.8–2.4)
NT-PROBNP SERPL-MCNC: 1573 PG/ML (ref ?–125)
PMV BLD: 10.9 FL (ref 7.6–11.3)
POTASSIUM SERPL-SCNC: 3.3 MMOL/L (ref 3.5–5.1)
RBC # BLD: 5.15 M/UL (ref 4.33–5.43)
TROPONIN (EMERG DEPT USE ONLY): 0.03 NG/ML (ref 0–0.04)

## 2018-12-19 PROCEDURE — 84484 ASSAY OF TROPONIN QUANT: CPT

## 2018-12-19 PROCEDURE — 85025 COMPLETE CBC W/AUTO DIFF WBC: CPT

## 2018-12-19 PROCEDURE — 83735 ASSAY OF MAGNESIUM: CPT

## 2018-12-19 PROCEDURE — B548ZZA ULTRASONOGRAPHY OF SUPERIOR VENA CAVA, GUIDANCE: ICD-10-PCS

## 2018-12-19 PROCEDURE — 93005 ELECTROCARDIOGRAM TRACING: CPT

## 2018-12-19 PROCEDURE — 36415 COLL VENOUS BLD VENIPUNCTURE: CPT

## 2018-12-19 PROCEDURE — 02HV33Z INSERTION OF INFUSION DEVICE INTO SUPERIOR VENA CAVA, PERCUTANEOUS APPROACH: ICD-10-PCS

## 2018-12-19 PROCEDURE — 80076 HEPATIC FUNCTION PANEL: CPT

## 2018-12-19 PROCEDURE — 71045 X-RAY EXAM CHEST 1 VIEW: CPT

## 2018-12-19 PROCEDURE — 80048 BASIC METABOLIC PNL TOTAL CA: CPT

## 2018-12-19 PROCEDURE — 83880 ASSAY OF NATRIURETIC PEPTIDE: CPT

## 2018-12-19 PROCEDURE — 93306 TTE W/DOPPLER COMPLETE: CPT

## 2018-12-19 PROCEDURE — 99292 CRITICAL CARE ADDL 30 MIN: CPT

## 2018-12-19 PROCEDURE — 85610 PROTHROMBIN TIME: CPT

## 2018-12-19 PROCEDURE — 99291 CRITICAL CARE FIRST HOUR: CPT

## 2018-12-19 RX ADMIN — APIXABAN SCH MG: 5 TABLET, FILM COATED ORAL at 17:11

## 2018-12-19 RX ADMIN — METOPROLOL TARTRATE SCH MG: 50 TABLET, FILM COATED ORAL at 17:11

## 2018-12-19 RX ADMIN — DEXTROSE SCH MLS: 5 SOLUTION INTRAVENOUS at 18:19

## 2018-12-19 NOTE — ECHO
HEIGHT: 5 ft 7 in   WEIGHT: 236 lb 0 oz   DATE OF STUDY: 12/19/18   REFER DR: Ebenezer Hsu MD

2-DIMENSIONAL: YES

     M.MODE: YES

 DOPPLER: YES

COLOR FLOW: YES



                    TDS:  

PORTABLE: 

 DEFINITY:  

BUBBLE STUDY: 





DIAGNOSIS:  SUPRAVENTRICULAR TACHYCARDIA, ATRIAL FIBRILLATION.



CARDIAC HISTORY:  

CATHERIZATION: NO

SURGERY: NO

PROSTHETIC VALVE: NO

PACEMAKER: NO





MEASUREMENTS (cm)

    DIASTOLIC (NORMALS)                 SYSTOLIC (NORMALS)

IVSd                 1.1 (0.6-1.2)                    LA Diam 4.3 (1.9-4.0)     LVEF       
  44%  

LVIDd               5.6 (3.5-5.7)                        LVIDs      4.3 (2.0-3.5)     %FS  
        22%

LVPWd             1.2 (0.6-1.2)

Ao Diam           2.6 (2.0-3.7)



2 DIMENSIONAL ASSESSMENT:

RIGHT ATRIUM:                   NORMAL

LEFT ATRIUM:       DILATED



RIGHT VENTRICLE:            NORMAL

LEFT VENTRICLE: NORMAL



TRICUSPID VALVE:             NORMAL

MITRAL VALVE:     NORMAL



PULMONIC VALVE:             NORMAL

AORTIC VALVE:     NORMAL



PERICARDIAL EFFUSION: NONE

AORTIC ROOT:      NORMAL





LEFT VENTRICULAR WALL MOTION:     GLOBAL HYPOKINESIS, MILD.



DOPPLER/COLOR FLOW:     IMPAIRED LEFT VENTRICULAR RELAXATION.



COMMENTS:      THIS STUDY WAS DONE IN ATRIAL FLUTTER.  HEART RATE 123 BEATS PER MINUTE.  
DEPRESSED LEFT VENTRICULAR EJECTION FRACTION.  DILATED LEFT ATRIUM.  IMPAIRED LEFT 
VENTRICULAR RELAXATION.



TECHNOLOGIST:   ZACHARY LEVY

## 2018-12-19 NOTE — EKG
Test Date:    2018-12-19               Test Time:    07:41:11

Technician:   AMG                                    

                                                     

MEASUREMENT RESULTS:                                       

Intervals:                                           

Rate:         124                                    

TN:                                                  

QRSD:         84                                     

QT:           342                                    

QTc:          491                                    

Axis:                                                

P:            -88                                    

TN:                                                  

QRS:          56                                     

T:            268                                    

                                                     

INTERPRETIVE STATEMENTS:                                       

                                                     

Atrial flutter with 2:1 AV conduction

Left ventricular hypertrophy 

Non specific ST abnormality

Abnormal ECG

Compared to ECG 12/19/2018 06:58:32

Left ventricular hypertrophy now present



Electronically Signed On 12-19-18 17:16:51 CST by Ebenezer Hsu

## 2018-12-19 NOTE — EKG
Test Date:    2018-12-19               Test Time:    06:58:32

Technician:   LALIT                                     

                                                     

MEASUREMENT RESULTS:                                       

Intervals:                                           

Rate:         163                                    

LA:           128                                    

QRSD:         76                                     

QT:           338                                    

QTc:          556                                    

Axis:                                                

P:                                                   

LA:           128                                    

QRS:          46                                     

T:            269                                    

                                                     

INTERPRETIVE STATEMENTS:                                       

                                                     

Atrial flutter with variable AV block

Non specific ST abnormality 

Abnormal ECG

Compared to ECG 09/02/2018 11:03:11

Atrial premature complex(es) now present

Ventricular premature complex(es) now present

Sinus rhythm no longer present



Electronically Signed On 12-19-18 09:42:10 CST by Ebenezer Hsu

## 2018-12-19 NOTE — EDPHYS
Physician Documentation                                                                           

 [facility]                                                                

Name: Damian Briggs                                                                                 

Age: 58 yrs                                                                                       

Sex: Male                                                                                         

: 1960                                                                                   

MRN: [id number]                                                                                   

Arrival Date: 2018                                                                          

Time: 07:00                                                                                       

Account#: [id number]                                                                            

Bed 4                                                                                             

Private MD:                                                                                       

ED Physician Mark Du                                                                       

HPI:                                                                                              

                                                                                             

07:15 This 58 yrs old Black Male presents to ER via EMS with complaints of Irregular Pulse.   kdr 

07:15 The patient presents with a history of irregular heart beat, heart racing. Context: The kdr 

      symptoms occur at rest. Onset: The symptoms/episode began/occurred suddenly, at 05:00.      

      Duration: The patient or guardian reports a single episode, that is still ongoing, but      

      improving. Modifying factors: The symptoms are aggravated by nothing. The symptoms are      

      alleviated by nothing. Associated signs and symptoms: Pertinent positives: anxiety,         

      chest pain, SOB. Severity of symptoms: At their worst the symptoms were incapacitating      

      just prior to arrival, in the emergency department the symptoms have improved               

      moderately. The patient has experienced similar episodes in the past, a few times. The      

      patient has been recently seen by a physician: The patient was cardioverted a few           

      months ago. He was also seen by Dr. Neumann yesterday and told that ne may need an          

      ablation. His heart rate has been persistently elevated for some time.                      

                                                                                                  

Historical:                                                                                       

- Allergies:                                                                                      

07:08 No Known Allergies;                                                                     sg  

- Home Meds:                                                                                      

07:05 atorvastatin 10 mg Oral tab 1 tab nightly [Active]; Eliquis 5 mg Oral tab 1 tab 2 times sg  

      per day [Active]; metoprolol tartrate 25 mg Oral tab 1 tab 2 times per day [Active];        

07:30 sotalol 80 mg Oral tab 1 tab 2 times per day [Active]; atorvastatin 10 mg oral tab 1    sg  

      tab once daily [Active];                                                                    

- PMHx:                                                                                           

07:05 Atrial Fib; High Cholesterol; Hypertension;                                             sg  

                                                                                                  

- Immunization history:: Adult Immunizations up to date.                                          

- Social history:: Smoking status: unknown.                                                       

- Ebola Screening: : Patient negative for fever greater than or equal to 101.5 degrees            

  Fahrenheit, and additional compatible Ebola Virus Disease symptoms Patient denies               

  exposure to infectious person Patient denies travel to an Ebola-affected area in the            

  21 days before illness onset No symptoms or risks identified at this time.                      

                                                                                                  

                                                                                                  

ROS:                                                                                              

07:15 Constitutional: Negative for fever, chills, and weight loss, Eyes: Negative for injury, kdr 

      pain, redness, and discharge, ENT: Negative for injury, pain, and discharge, Neck:          

      Negative for injury, pain, and swelling, Abdomen/GI: Negative for abdominal pain,           

      nausea, vomiting, diarrhea, and constipation, Back: Negative for injury and pain, :       

      Negative for injury, bleeding, discharge, and swelling, MS/Extremity: Negative for          

      injury and deformity, Skin: Negative for injury, rash, and discoloration, Neuro:            

      Negative for headache, weakness, numbness, tingling, and seizure activity. Psych:           

      Negative for depression, anxiety, suicide ideation, homicidal ideation, and                 

      hallucinations, Allergy/Immunology: Negative for hives, rash, and allergies, Endocrine:     

      Negative for neck swelling, polydipsia, polyuria, polyphagia, and marked weight             

      changes, Hematologic/Lymphatic: Negative for swollen nodes, abnormal bleeding, and          

      unusual bruising.                                                                           

07:15 Cardiovascular: Positive for chest pain, palpitations, Negative for edema, orthopnea.       

07:15 Respiratory: Positive for dyspnea on exertion, shortness of breath, at rest. wheezing,      

      expiratory.                                                                                 

07:15 Skin: Positive for diaphoresis.                                                             

                                                                                                  

Exam:                                                                                             

07:15 Constitutional:  This is a well developed, well nourished patient who is awake, alert,  kdr 

      and in moderate to severe distress. Head/Face:  Normocephalic, atraumatic. Eyes:            

      Pupils equal round and reactive to light, extra-ocular motions intact.  Lids and lashes     

      normal.  Conjunctiva and sclera are non-icteric and not injected.  Cornea within normal     

      limits.  Periorbital areas with no swelling, redness, or edema. Neck:  Trachea midline,     

      no thyromegaly or masses palpated, and no cervical lymphadenopathy.  Supple, full range     

      of motion without nuchal rigidity, or vertebral point tenderness.  No Meningismus.          

      Chest/axilla:  Normal chest wall appearance and motion.  Nontender with no deformity.       

      No lesions are appreciated. Abdomen/GI:  Soft, non-tender, with normal bowel sounds.        

      No distension or tympany.  No guarding or rebound.  No evidence of tenderness               

      throughout. Back:  No spinal tenderness.  No costovertebral tenderness.  Full range of      

      motion. Skin:  Warm, dry with normal turgor.  Normal color with no rashes, no lesions,      

      and no evidence of cellulitis. MS/ Extremity:  Pulses equal, no cyanosis.                   

      Neurovascular intact.  Full, normal range of motion. Neuro:  Awake and alert, GCS 15,       

      oriented to person, place, time, and situation.  Cranial nerves II-XII grossly intact.      

      Motor strength 5/5 in all extremities.  Sensory grossly intact.  Cerebellar exam            

      normal.  Normal gait. Psych:  Awake, alert, with orientation to person, place and time.     

       Behavior, mood, and affect are within normal limits.                                       

07:15 Cardiovascular: Rate: tachycardic, actual rate is  156 bpm, Rhythm: regular, Pulses: no     

      pulse deficits are appreciated, Heart sounds: normal, Edema: is not appreciated, JVD:       

      is not appreciated.                                                                         

07:15 ECG was reviewed by the Attending Physician.                                                

                                                                                                  

Vital Signs:                                                                                      

06:58 Pulse 186 MON; Resp 26; Pulse Ox 99% on R/A;                                            sg  

06:58  / 118;                                                                           sg  

07:10  / 115;                                                                           sg  

07:27  / 80; Pulse 125 MON; Resp 36 S; Pulse Ox 98% on 100% Nebulizer Mask;             sg  

07:40  / 98; Pulse 124; Resp 18; Temp 97.0; Pulse Ox 100% on Nebulizer Mask;            sg  

07:56  / 98; Pulse 125; Pulse Ox 100% on Nebulizer Mask;                                sg  

08:05  / 93; Pulse 122 MON; Resp 26 S; Pulse Ox 100% ;                                  sg  

08:41  / 92; Pulse 125; Resp 26; Pulse Ox 98% ;                                         sv  

09:38  / 99; Pulse 125; Resp 27; Pulse Ox 98% ;                                         sv  

10:04  / 95; Pulse 124; Resp 24 S; Pulse Ox 99% on R/A; Pain 2/10;                      sg  

10:30  / 92; Pulse 122 MON; Resp 24 S; Pulse Ox 100% on R/A;                            sg  

08:05 Sinus tachycardia                                                                       sg  

10:30 Sinus tachycardia                                                                       sg  

                                                                                                  

MDM:                                                                                              

09:11 Patient medically screened.                                                             kdr 

09:11 Data reviewed: vital signs, nurses notes, lab test result(s), EKG, radiologic studies.  kdr 

      Counseling: I had a detailed discussion with the patient and/or guardian regarding: the     

      historical points, exam findings, and any diagnostic results supporting the                 

      discharge/admit diagnosis, lab results, radiology results, the need for further work-up     

      and treatment in the hospital. Physician consultation: Sultana Newby MD.                     

10:45 ED course: The patient is feeling much better and his symptoms have largely resolved.   kdr 

      Will place in ICU until cardiology clearance for the floor.                                 

                                                                                                  

                                                                                             

07:08 Order name: Basic Metabolic Panel; Complete Time: 08:31                                 sg  

                                                                                             

07:08 Order name: CBC with Diff; Complete Time: 08:31                                         sg  

                                                                                             

07:08 Order name: LFT's; Complete Time: 08:31                                                                                                                                              

07:08 Order name: Magnesium; Complete Time: 08:31                                             sg  

                                                                                             

07:08 Order name: NT PRO-BNP; Complete Time: 08:31                                            sg  

                                                                                             

07:08 Order name: PT-INR; Complete Time: 08:31                                                sg  

                                                                                             

07:08 Order name: Troponin (emerg Dept Use Only); Complete Time: 08:31                        sg  

                                                                                             

10:18 Order name: Basic Metabolic Panel                                                       EDMS

                                                                                             

10:18 Order name: Basic Metabolic Panel                                                       EDMS

                                                                                             

10:18 Order name: CBC with Automated Diff                                                     EDMS

                                                                                             

10:18 Order name: CBC with Automated Diff                                                     EDMS

                                                                                             

10:18 Order name: Troponin I                                                                  EDMS

                                                                                             

10:18 Order name: Troponin I                                                                  EDMS

                                                                                             

10:18 Order name: Troponin I                                                                  EDMS

                                                                                             

07:08 Order name: XRAY Chest (1 view)                                                           

                                                                                             

07:08 Order name: EKG; Complete Time: 07:20                                                                                                                                                

07:08 Order name: Cardiac monitoring; Complete Time: 07:10                                                                                                                                 

07:08 Order name: EKG - Nurse/Tech; Complete Time: 07:10                                                                                                                                   

08:00 Order name: EKG Electrocardiogram                                                       EDMS

                                                                                             

10:17 Order name: CONS Physician Consult                                                      EDMS

                                                                                             

10:17 Order name: Regular                                                                     EDMS

                                                                                             

10:17 Order name: EKG Electrocardiogram                                                       EDMS

                                                                                             

10:18 Order name: EKG Electrocardiogram                                                       EDMS

                                                                                             

10:18 Order name: EKG Electrocardiogram                                                       EDMS

                                                                                             

10:18 Order name: EKG Electrocardiogram                                                       EDMS

                                                                                             

07:08 Order name: IV Saline Lock; Complete Time: 07:10                                                                                                                                     

07:08 Order name: Labs collected and sent; Complete Time: 07:10                                                                                                                            

07:08 Order name: O2 Per Protocol; Complete Time: 07:10                                         

                                                                                             

07:08 Order name: O2 Sat Monitoring; Complete Time: 07:10                                       

                                                                                             

07:42 Order name: Labs - recollect needed; Complete Time: 07:55                               bd  

                                                                                                  

EC:15 Rate is 156 beats/min. Rhythm is regular, Sinus tachycardia with No ectopy. QRS Axis is kdr 

      Normal. VA interval is normal. QRS interval is normal. QT interval is normal. No Q          

      waves. T waves are Normal. No ST changes noted. Clinical impression: Sinus tachycardia.     

                                                                                                  

Administered Medications:                                                                         

07:05 Drug: Lopressor 5 mg Route: IVP; Site: right wrist;                                     sg  

07:06 Drug: Versed 2 mg Route: IVP; Site: right wrist;                                        sg  

07:50 Follow up: Response: No adverse reaction; Pain is decreased                             sg  

07:08 Drug: fentaNYL (PF) 25 mcg Route: IVP; Site: right wrist;                               sg  

07:50 Follow up: Response: No adverse reaction; Pain is decreased                             sg  

07:18 Drug: Lopressor 5 mg Route: IVP; Site: left hand;                                       sg  

07:22 Drug: Xopenex (3) 1.25 mg Route: Inhalation;                                            sg  

07:26 Drug: SOLU-Medrol 125 mg Route: IVP; Site: left hand;                                   sg  

                                                                                                  

                                                                                                  

Disposition:                                                                                      

18 09:11 Hospitalization ordered by Amaury Marie for Inpatient Admission. Preliminary       

  diagnosis is SVT, SOB.                                                                          

- Bed requested for Intensive Care Unit.                                                          

- Status is Inpatient Admission.                                                              sg  

- Condition is Serious.                                                                           

- Problem is an acute exacerbation.                                                               

- Symptoms have improved.                                                                         

UTI on Admission? No                                                                              

                                                                                                  

                                                                                                  

                                                                                                  

Signatures:                                                                                       

Dispatcher [employer]                           EDMS                                                 

Radha Tran Steven, RN                         RN                                                      

Mark Du MD MD   kdr                                                  

                                                                                                  

Corrections: (The following items were deleted from the chart)                                    

10:22 09:11 Hospitalization Ordered by Sultana Newby MD for Inpatient Admission. Preliminary   kdr 

      diagnosis is SVT, SOB. Bed requested for Intensive Care Unit. Status is Inpatient           

      Admission. Condition is Serious. Problem is an acute exacerbation. Symptoms have            

      improved. UTI on Admission? No. kdr                                                         

10:40 10:22 2018 09:11 Hospitalization Ordered by Amaury Marie MD for Inpatient          bd  

      Admission. Preliminary diagnosis is SVT, SOB. Bed requested for Intensive Care Unit.        

      Status is Inpatient Admission. Condition is Serious. Problem is an acute exacerbation.      

      Symptoms have improved. UTI on Admission? No. kdr                                           

12:33 10:40 2018 09:11 Hospitalization Ordered by Amaury Marie MD for Inpatient          sg  

      Admission. Preliminary diagnosis is SVT, SOB. Bed requested for Intensive Care Unit.        

      Status is Inpatient Admission. Condition is Serious. Problem is an acute exacerbation.      

      Symptoms have improved. UTI on Admission? No. bd                                            

                                                                                                  

**************************************************************************************************

## 2018-12-19 NOTE — RAD REPORT
EXAM DESCRIPTION:  RAD - Chest Single View - 12/19/2018 8:43 am

 

CLINICAL HISTORY:  Chest pain, palpitations, shortness of breath, history of supraventricular tachyca
rdia

 

COMPARISON:  August 31, 2018

 

TECHNIQUE:  AP portable chest image was obtained 0745 hours .

 

FINDINGS:  No consolidation, mass or significant failure finding. Cardiomegaly is present similar to 
comparison. Resuscitation paddles overlie the chest. Lung parenchyma is not clearly different from co
mparison. There may be some minimal lung base interstitial edema or infiltrate. Current examination s
hows a more shallow inspiratory effort. Trachea is midline. No measurable pleural effusion and no pne
umothorax. No acute bony abnormality seen. No acute aortic findings suspected.

 

IMPRESSION:  No focal consolidation, mass or diffuse pulmonary edema pattern.

 

Inspiratory effort is more shallow. Accentuated lung base markings may simply be artifact of the insp
iratory effort. A minimal lung base interstitial edema or infiltrate not excluded.

## 2018-12-19 NOTE — CON
Chief Complaint:  Heart racing.



History Of Present Illness:  Mr. Briggs is a gentleman, who has had atrial fibrillation in the past.  H
brenda has been cardioverted.  He has been on Betapace and metoprolol.  Recently, the doses of metoprolol 
were changed because his heart rate was too slow.  He is feeling other side effects of beta-blockers 
and today he went into rapid heart rate again.  Emergency medical technicians gave a verbal report th
at his heart rate was 250, received several doses of adenosine and then a cardioversion, and then cam
e to our hospital.  He was actually in atrial flutter when he came to the hospital.  He now seems to 
be in sinus tachycardia.  It is possible it is still atrial flutter and it is very possible that what
 he had when the EMT saw him was atrial flutter with 1:1 conduction.  It is actually a little bit sussy
prising to me that we do not have those rhythm strips left by the EMTs, but we will try and get them 
so his chart is full of all the information we need to help him.  Presently, I do not think he is in 
atrial flutter.  I think it is sinus tach at 122.  The patient does not have coronary heart disease. 
 He does not have diabetes.  He has underlying hypertension, never used tobacco.



Physical Examination:

Vital Signs:  5 feet 7 inches and 236 pounds. 

HEENT :  Normal. 

LUNGS:  Clear. 

Heart:  Rapid, regular. 

Extremities:  No cyanosis, clubbing, or edema. 



We should do an echocardiogram to repeat EKG, see if he needs to be cardioverted right now.  He is fu
lly anticoagulated with Eliquis.  We will start an amiodarone drip and transfer him to Dr. Graff for
 an EP procedure or if that is unable to happen because of [facility] being full.  If he is s
table enough, he could be discharged home and see Dr. Graff either on the 26th or 27th of December. 
 He already has an appointment to see Dr. Graff. 



Thank you very much for your kind referral of Mr. Briggs.  I will follow him with you.





DANY/HARSHA

DD:  12/19/2018 15:44:44Voice ID:  [id number]

DT:  12/19/2018 20:13:40Report ID:  [id number]

## 2018-12-19 NOTE — RAD REPORT
EXAM DESCRIPTION:  RAD - Chest Single View - 12/19/2018 4:54 pm

 

CLINICAL HISTORY:  PICC line placement

 

COMPARISON:  December 19

 

FINDINGS:  Portable chest was obtained following placement of a right upper extremity PICC line. The 
catheter tip is in the distal SVC.

## 2018-12-19 NOTE — ER
Nurse's Notes                                                                                     

 [facility]                                                                

Name: Damian Briggs                                                                                 

Age: 58 yrs                                                                                       

Sex: Male                                                                                         

: 1960                                                                                   

MRN: [id number]                                                                                   

Arrival Date: 2018                                                                          

Time: 07:00                                                                                       

Account#: [id number]                                                                            

Bed 4                                                                                             

Private MD:                                                                                       

Diagnosis: SVT, SOB                                                                               

                                                                                                  

Presentation:                                                                                     

                                                                                             

07:00 Presenting complaint: EMS states: CP that started this morning, also noted palpitations sg  

      and shortness of breath, reports has had a history of SVT that was corrected with           

      medications, cant recall how long ago the last episode was. Transition of care: patient     

      was not received from another setting of care. Onset of symptoms was 2018.     

      Risk Assessment: Do you want to hurt yourself or someone else? Patient reports no           

      desire to harm self or others. Initial Sepsis Screen: Does the patient meet any 2           

      criteria? RR > 20 per min. HR > 90 bpm. Does the patient have a suspected source of         

      infection? No. Patient's initial sepsis screen is negative. Care prior to arrival:          

      Medication(s) given: Adenosine, 6 mg, x 2, IV initiated. 22 GA, in the right wrist, IV      

      d/c'd PTA by patient. Care prior to arrival: Medication(s) given: Adenosine, 12 mg, x       

      1, shocked administered  Jouls per EMS.                                              

07:00 Method Of Arrival: EMS: [facility]                                                       sg  

07:00 Acuity: LIYA 1                                                                           sg  

                                                                                                  

Historical:                                                                                       

- Allergies:                                                                                      

07:08 No Known Allergies;                                                                     sg  

- Home Meds:                                                                                      

07:05 atorvastatin 10 mg Oral tab 1 tab nightly [Active]; Eliquis 5 mg Oral tab 1 tab 2 times sg  

      per day [Active]; metoprolol tartrate 25 mg Oral tab 1 tab 2 times per day [Active];        

07:30 sotalol 80 mg Oral tab 1 tab 2 times per day [Active]; atorvastatin 10 mg oral tab 1    sg  

      tab once daily [Active];                                                                    

- PMHx:                                                                                           

07:05 Atrial Fib; High Cholesterol; Hypertension;                                             sg  

                                                                                                  

- Immunization history:: Adult Immunizations up to date.                                          

- Social history:: Smoking status: unknown.                                                       

- Ebola Screening: : Patient negative for fever greater than or equal to 101.5 degrees            

  Fahrenheit, and additional compatible Ebola Virus Disease symptoms Patient denies               

  exposure to infectious person Patient denies travel to an Ebola-affected area in the            

  21 days before illness onset No symptoms or risks identified at this time.                      

                                                                                                  

                                                                                                  

Screenin:25 Abuse screen: Denies threats or abuse. Denies injuries from another. Nutritional        sg  

      screening: No deficits noted. Tuberculosis screening: No symptoms or risk factors           

      identified. Fall Risk None identified.                                                      

                                                                                                  

Assessment:                                                                                       

06:58 General: Appears distressed, well groomed, well developed, well nourished, Behavior is  sg  

      agitated, anxious, restless. Pain: Complains of pain in chest Pain currently is 10 out      

      of 10 on a pain scale. Quality of pain is described as sharp, stabbing. Neuro: Level of     

      Consciousness is awake, alert, obeys commands, Oriented to person, place, time,             

      situation, Speech is normal, Facial symmetry appears normal. Cardiovascular: Chest pain     

      is described as severe, quality is sharp. Respiratory: Airway is patent Respiratory         

      effort is labored, Respiratory pattern is symmetrical, tachypnea. GI: Abdomen is round      

      non-distended. : No deficits noted. EENT: No deficits noted. Derm: Skin is intact, is     

      healthy with good turgor, Skin is diaphoretic, Skin is normal, Skin temperature is          

      cold. Musculoskeletal: No deficits noted.                                                   

07:24 Reassessment: Patient appears in no apparent distress at this time. pt resp are         sg  

      becoming increasingly labored, resp rate about 40 bpm,  notified, orders          

      received for RSI kit and solumedrol 125 mg IVP, pt family at bedside at this time.          

07:35 Reassessment: ordered to monitor pt resp status, hold on RSI at this time, RT Deb and sg  

      Tracy remain at bedside.                                                                     

07:37 Reassessment: Patient appears in no apparent distress at this time. pt spouse/family    sg  

      removed jewelry and belongings at this time, pt pants and socks and shoes remain on pt      

      at this time, pt t-shirt cut PTA in EMS.                                                    

07:39 Reassessment: Patient appears in no apparent distress at this time. pt spouse/family    sg  

      removed wallet and other belongings, pt family reports she will be going home now and       

      will come back later today.                                                                 

07:55 Reassessment: Patient is alert, oriented x 3, equal unlabored respirations, skin        sg  

      warm/dry/pink. Xray at bedside.                                                             

07:59 Reassessment: Patient appears in no apparent distress at this time. pt reports feeling  sg  

      better, pt states he was up really late last night until about 0400 watching TV,            

      reports he would like to take a nap, lights dimmed, door closed, curtain remains opened     

      at this time for visualization, bed in low and locked position, SRX2, call light within     

      reach, pt remains on monitors at this time, will continue to monitor.                       

08:50 Reassessment: Patient appears in no apparent distress at this time. Patient is alert,   sg  

      oriented x 3, equal unlabored respirations, skin warm/dry/pink. awaiting admission          

      orders at this time, pt updated on the need for admit, pt stated understanding, will        

      continue to monitor Patient states feeling better.                                          

09:51 Reassessment: Patient appears in no apparent distress at this time. Patient is alert,   sg  

      oriented x 3, equal unlabored respirations, skin warm/dry/pink. Cardiovascular: Heart       

      tones S1 S2 present Capillary refill is brisk in bilateral fingers Patient's skin is        

      warm and dry. Pulses are palpable in right radial artery and left radial artery.            

      Respiratory: Airway is patent Respiratory effort is even, unlabored, Respiratory            

      pattern is symmetrical, tachypnea resp rate of 24 bpm Breath sounds are clear. Derm:        

      Skin is intact, is healthy with good turgor, Skin is dry, Skin is normal, Skin              

      temperature is warm.                                                                        

10:48 Reassessment: Patient appears in no apparent distress at this time. Patient and/or      sg  

      family updated on plan of care and expected duration. Pain level reassessed. Patient is     

      alert, oriented x 3, equal unlabored respirations, skin warm/dry/pink. pt updated on        

      ICU bed assignment number 3, attempt to give report but nurse unavailable at this time,     

      awaiting a call back, pt stated understanding, will continue to monitor.                    

                                                                                                  

Vital Signs:                                                                                      

06:58 Pulse 186 MON; Resp 26; Pulse Ox 99% on R/A;                                            sg  

06:58  / 118;                                                                           sg  

07:10  / 115;                                                                           sg  

07:27  / 80; Pulse 125 MON; Resp 36 S; Pulse Ox 98% on 100% Nebulizer Mask;             sg  

07:40  / 98; Pulse 124; Resp 18; Temp 97.0; Pulse Ox 100% on Nebulizer Mask;            sg  

07:56  / 98; Pulse 125; Pulse Ox 100% on Nebulizer Mask;                                sg  

08:05  / 93; Pulse 122 MON; Resp 26 S; Pulse Ox 100% ;                                  sg  

08:41  / 92; Pulse 125; Resp 26; Pulse Ox 98% ;                                         sv  

09:38  / 99; Pulse 125; Resp 27; Pulse Ox 98% ;                                         sv  

10:04  / 95; Pulse 124; Resp 24 S; Pulse Ox 99% on R/A; Pain 2/10;                      sg  

10:30  / 92; Pulse 122 MON; Resp 24 S; Pulse Ox 100% on R/A;                            sg  

08:05 Sinus tachycardia                                                                       sg  

10:30 Sinus tachycardia                                                                       sg  

                                                                                                  

Vitals:                                                                                           

06:58 Cardiac Rhythm Assessment SVT.                                                          sg  

07:27 Cardiac Rhythm Assessment Sinus tach.                                                   sg  

07:40 Cardiac Rhythm Assessment Sinus tach.                                                   sg  

10:04 Cardiac Rhythm Assessment Sinus tach.                                                   sg  

10:30 Cardiac Rhythm Assessment Sinus tach.                                                   sg  

                                                                                                  

ED Course:                                                                                        

06:58 Patient has correct armband on for positive identification. Bed in low position. Call   sg  

      light in reach. Side rails up X2. Cardiac monitor on. Pulse ox on. NIBP on.                 

07:00 Patient arrived in ED.                                                                  sg  

07:03 Triage completed.                                                                       sg  

07:03 Arm band placed on.                                                                     sg  

07:06 Inserted saline lock: 20 gauge in right wrist, using aseptic technique. Oxygen          sg  

      administration via non-rebreather mask \T\ 15L/min.                                           

07:11 Benedict Chicas, RN is Primary Nurse.                                                       sg  

07:13 Mark Du MD is Attending Physician.                                              kdr 

07:15 No provider procedures requiring assistance completed.                                  sg  

07:18 Initial lab(s) drawn, by ED staff, sent to lab. Inserted saline lock: 20 gauge in left  sg  

      hand, using aseptic technique.                                                              

07:45 X-ray completed. Portable x-ray completed in exam room. Patient tolerated procedure     jb2 

      well.                                                                                       

07:50 Lab(s) recollected, by me, sent to lab.                                                 sg  

07:57 EKG done, by EKG tech. reviewed by Mark Du MD Repeat EKG.                         at1 

07:58 Door closed. Lights dimmed. Warm blanket given. Pillow given. Verbal reassurance given. sg  

      Head of bed elevated.                                                                       

08:43 XRAY Chest (1 view) In Process Unspecified.                                             EDMS

09:09 Sultana Newby MD is Hospitalizing Provider.                                            kdr 

10:21 Amaury Marie MD is Hospitalizing Provider.                                             kdr 

12:30 Patient admitted, IV remains in place. intact, No redness/swelling at site.             sg  

                                                                                                  

Administered Medications:                                                                         

07:05 Drug: Lopressor 5 mg Route: IVP; Site: right wrist;                                     sg  

07:06 Drug: Versed 2 mg Route: IVP; Site: right wrist;                                        sg  

07:50 Follow up: Response: No adverse reaction; Pain is decreased                             sg  

07:08 Drug: fentaNYL (PF) 25 mcg Route: IVP; Site: right wrist;                               sg  

07:50 Follow up: Response: No adverse reaction; Pain is decreased                             sg  

07:18 Drug: Lopressor 5 mg Route: IVP; Site: left hand;                                       sg  

07:22 Drug: Xopenex (3) 1.25 mg Route: Inhalation;                                            sg  

07:26 Drug: SOLU-Medrol 125 mg Route: IVP; Site: left hand;                                   sg  

                                                                                                  

                                                                                                  

Outcome:                                                                                          

09:11 Decision to Hospitalize by Provider.                                                    kdr 

12:06 Admitted to ICU accompanied by nurse, accompanied by tech, via stretcher, room 3, on      

      monitor, with chart, Report called to  report given to Roselyn SMITH                         

12:06 Condition: stable                                                                           

12:06 Instructed on the need for admit, safety practices, Demonstrated understanding of           

      instructions.                                                                               

12:33 Patient left the ED.                                                                    sg  

                                                                                                  

Signatures:                                                                                       

Dispatcher [employer]                           EDMS                                                 

Karissa Tong RN                    SARAH                                                      

Benedict Chicas RN RN sg Rittger, Kevin, MD MD kdr Buechter, Jesse jb2                                                  

Marielos Gonzalez, EKG Tech              EKG Tat1                                                  

                                                                                                  

Corrections: (The following items were deleted from the chart)                                    

07:57 07:56 EKG done, by EKG tech. reviewed by Mark Du MD at1                           at1 

                                                                                                  

**************************************************************************************************

## 2018-12-19 NOTE — EKG
Test Date:    2018-12-19               Test Time:    15:51:53

Technician:   STANISLAV                                     

                                                     

MEASUREMENT RESULTS:                                       

Intervals:                                           

Rate:         124                                    

UT:           246                                    

QRSD:         130                                    

QT:           222                                    

QTc:          318                                    

Axis:                                                

P:            58                                     

UT:           246                                    

QRS:          34                                     

T:            84                                     

                                                     

INTERPRETIVE STATEMENTS:                                       

                                                     

Atrial flutter with 2:1 block

Non specific ST abnormality

Left ventricular hypertrophy

Abnormal ECG

Compared to ECG 12/19/2018 07:41:11

no significant change from previous ECG

Electronically Signed On 12-19-18 17:14:38 CST by Ebenezer Hsu

## 2018-12-20 VITALS — OXYGEN SATURATION: 98 %

## 2018-12-20 VITALS — TEMPERATURE: 98 F | SYSTOLIC BLOOD PRESSURE: 124 MMHG | DIASTOLIC BLOOD PRESSURE: 86 MMHG

## 2018-12-20 LAB
BUN BLD-MCNC: 13 MG/DL (ref 7–18)
GLUCOSE SERPLBLD-MCNC: 192 MG/DL (ref 74–106)
HCT VFR BLD CALC: 41.8 % (ref 39.6–49)
LYMPHOCYTES # SPEC AUTO: 1.3 K/UL (ref 0.7–4.9)
MORPHOLOGY BLD-IMP: (no result)
PMV BLD: 11 FL (ref 7.6–11.3)
POTASSIUM SERPL-SCNC: 4.4 MMOL/L (ref 3.5–5.1)
RBC # BLD: 4.48 M/UL (ref 4.33–5.43)

## 2018-12-20 PROCEDURE — 5A2204Z RESTORATION OF CARDIAC RHYTHM, SINGLE: ICD-10-PCS

## 2018-12-20 RX ADMIN — DEXTROSE SCH MLS: 5 SOLUTION INTRAVENOUS at 02:21

## 2018-12-20 RX ADMIN — APIXABAN SCH MG: 5 TABLET, FILM COATED ORAL at 06:49

## 2018-12-20 RX ADMIN — Medication SCH: at 08:08

## 2018-12-20 RX ADMIN — Medication SCH ML: at 00:48

## 2018-12-20 RX ADMIN — METOPROLOL TARTRATE SCH MG: 50 TABLET, FILM COATED ORAL at 06:48

## 2018-12-20 NOTE — HP
Date of Admission:  12/19/2018



Chief Complaint:  Atrial flutter with fast ventricular response.



History Of Present Illness:  A 58-year-old male who is known to have history of chronic atrial fibril
lation, was having palpitations, was brought to the ER.  On the way, the patient had a medication tri
al as well as cardioversion done.  The patient still was in atrial flutter with the rate of over 180.
  At this point, the patient is admitted.  The patient has been following Dr. Neumann for atrial flut
ter for some time.  He is already on Eliquis, metoprolol, sotalol, Lipitor.



Past Medical History:  History of hypertension, high cholesterol, and atrial fibrillation.



Family History:  Noncontributory.



Personal History:  No known allergies.



Home Medicines:  Lipitor, sotalol, metoprolol, Eliquis.



Review of Systems:

No fever, chills, rigors.



Physical Examination:

General:  Revealed a 58-year-old obese male, conversant and alert. 

HEENT:  Negative. 

Neck:  Supple.  JVD negative. 

Chest:  Clear. 

Heart:  Tachycardia.  Irregularity. 

Abdomen:  Soft. 

Extremities:  No edema.



Laboratory Data:  White count 9.9.  Chem profile; potassium 3.3, random blood sugar of 152.  BNP 1573
.



Assessment:  

1.Chronic atrial fibrillation with rapid ventricular response status post cardioversion.

2.Hypertension.

3.Hyperlipidemia.



Plan:  The patient received oral potassium and IV Lopressor.  The patient is scheduled for cardiovers
ion, elective, this morning.





FRANCIS/HRASHA

DD:  12/20/2018 08:27:46Voice ID:  [id number]

## 2018-12-21 NOTE — EKG
Test Date:    2018-12-20               Test Time:    08:13:14

Technician:   LALIT                                     

                                                     

MEASUREMENT RESULTS:                                       

Intervals:                                           

Rate:         67                                     

MS:           156                                    

QRSD:         88                                     

QT:           482                                    

QTc:          509                                    

Axis:                                                

P:            60                                     

MS:           156                                    

QRS:          28                                     

T:            20                                     

                                                     

INTERPRETIVE STATEMENTS:                                       

                                                     

Normal sinus rhythm

Possible Left atrial enlargement

Left ventricular hypertrophy

Prolonged QT

Abnormal ECG

Compared to ECG 12/19/2018 15:51:53

Prolonged QT interval now present

Atrial flutter no longer present

2:1 AV block no longer present

ST (T wave) deviation no longer present



Electronically Signed On 12-21-18 06:53:30 CST by Fish Neumann

## 2018-12-21 NOTE — OP
Date of Procedure:  12/20/2018



Surgeon:  Fish Neumann MD



Procedure:  Direct current cardioversion.



Indication:  Atrial fibrillation and atrial flutter.



History Of Present Illness:  Mr. Briggs was admitted on 12/19/2018, with rapid 1:1 atrial flutter.  He 
was placed on IV amiodarone overnight.  In the past, he has tried Bystolic and metoprolol and he was 
taking them both when he had this episode.  Metoprolol was discontinued.  Betapace was discontinued. 
 He was placed on IV amiodarone and remained in atrial fibrillation.  He received 10 mg of Versed IV 
sedation.  He was shocked with 100 joules synchronized shock and converted to sinus rhythm.  There we
re no complications.  Total conscious sedation was 30 minutes.



:  Fish Neumann M.D.



Final Diagnosis:  Successful cardioversion of atrial flutter to sinus rhythm.  The patient will be go
ing home on 400 mg of amiodarone p.o. b.i.d.  He has an appointment with Dr. Get Nolasco at [facility] on December 27, 2018, for him to be considered for an ablation.





NB/HARSHA

DD:  12/20/2018 23:06:19Voice ID:  [id number]

DT:  12/21/2018 04:24:19Report ID:  [id number]